# Patient Record
Sex: MALE | Race: WHITE | NOT HISPANIC OR LATINO | Employment: OTHER | URBAN - METROPOLITAN AREA
[De-identification: names, ages, dates, MRNs, and addresses within clinical notes are randomized per-mention and may not be internally consistent; named-entity substitution may affect disease eponyms.]

---

## 2021-01-14 DIAGNOSIS — Z23 NEED FOR VACCINATION: ICD-10-CM

## 2021-01-26 PROCEDURE — 0001A PFIZER SARS-COV-2 VACCINE: CPT

## 2021-01-26 PROCEDURE — 91300 PFIZER SARS-COV-2 VACCINE: CPT

## 2021-01-27 ENCOUNTER — IMMUNIZATION (OUTPATIENT)
Dept: FAMILY PLANNING/WOMEN'S HEALTH CLINIC | Facility: IMMUNIZATION CENTER | Age: 81
End: 2021-01-27
Payer: MEDICARE

## 2021-01-27 DIAGNOSIS — Z23 ENCOUNTER FOR VACCINATION: Primary | ICD-10-CM

## 2021-02-18 ENCOUNTER — NURSE TRIAGE (OUTPATIENT)
Dept: HEALTH INFORMATION MANAGEMENT | Facility: OTHER | Age: 81
End: 2021-02-18

## 2021-02-18 NOTE — TELEPHONE ENCOUNTER
"20 - 30 minutes after getting the Pfizer COVID vaccination, body/face swelled up, not throat, but difficult time breathing.      Stated that is RIGHT leg is still swollen.    Advised pt not to get the second dose, and to contact his PCP for further instructions, and gave pt the number to [facility] to report his symptoms.        Additional Information  • Negative: Difficulty with breathing or swallowing starts within 2 hours after injection  • Negative: Difficult to awaken or acting confused (e.g., disoriented, slurred speech)  • Negative: Unresponsive, passed out, or very weak  • Negative: Sounds like a life-threatening emergency to the triager  • Negative: Sounds like a severe, unusual reaction to the triager  • Negative: Fever > 103 F (39.4 C)  • Negative: Fever > 101 F (38.3 C) and over 60 years of age  • Negative: Fever > 100.0 F (37.8 C) and has diabetes mellitus or a weak immune system (e.g., HIV positive, cancer chemotherapy, organ transplant, splenectomy, chronic steroids)  • Negative: Fever > 100.0 F (37.8 C) and bedridden (e.g., nursing home patient, stroke, chronic illness, recovering from surgery)  • Negative: Measles vaccine and purple/blood-colored rash (onset day 6-12)  • Negative: Redness or red streak around the injection site begins > 48 hours after shot  • Negative: Fever present > 3 days (72 hours)  • Negative: Smallpox vaccine and eye pain, eye redness, or rash on eyelids  • Negative: Deep lump follows (in 2 to 8 weeks) Td or TDaP shot, and becomes tender to the touch  • Negative: Patient wants to be seen  • Negative: Mild immunization reaction  • Negative: Painless lump at Tetanus-diphtheria (Td) injection site  • Negative: Immunization reactions, questions about    Answer Assessment - Initial Assessment Questions  1. SYMPTOMS: \"What is the main symptom?\" (e.g., redness, swelling, pain)       Swelling all over his body and difficulty breathing  2. ONSET: \"When was the vaccine (shot) given?\" \"How " "much later did the 20-30 minutes begin?\" (e.g., hours, days ago)         3. SEVERITY: \"How bad is it?\"       Bad  4. FEVER: \"Is there a fever?\" If so, ask: \"What is it, how was it measured, and when did it start?\"       Yes  5. IMMUNIZATIONS GIVEN: \"What shots have you recently received?\"      COVID  6. PAST REACTIONS: \"Have you reacted to immunizations before?\" If so, ask: \"What happened?\"      No  7. OTHER SYMPTOMS: \"Do you have any other symptoms?\"      No    Protocols used: IMMUNIZATION [id number]      "

## 2021-08-21 ENCOUNTER — PATIENT MESSAGE (OUTPATIENT)
Dept: HEALTH INFORMATION MANAGEMENT | Facility: OTHER | Age: 81
End: 2021-08-21

## 2021-12-01 ENCOUNTER — TELEPHONE (OUTPATIENT)
Dept: HEALTH INFORMATION MANAGEMENT | Facility: OTHER | Age: 81
End: 2021-12-01

## 2022-06-09 ENCOUNTER — OFFICE VISIT (OUTPATIENT)
Dept: MEDICAL GROUP | Facility: MEDICAL CENTER | Age: 82
End: 2022-06-09
Payer: MEDICARE

## 2022-06-09 VITALS
DIASTOLIC BLOOD PRESSURE: 64 MMHG | SYSTOLIC BLOOD PRESSURE: 132 MMHG | BODY MASS INDEX: 24.68 KG/M2 | HEART RATE: 71 BPM | OXYGEN SATURATION: 97 % | WEIGHT: 166.6 LBS | HEIGHT: 69 IN | TEMPERATURE: 98.2 F

## 2022-06-09 DIAGNOSIS — Z13.220 SCREENING FOR LIPID DISORDERS: ICD-10-CM

## 2022-06-09 DIAGNOSIS — Z13.0 ENCOUNTER FOR SCREENING FOR HEMATOLOGIC DISORDER: ICD-10-CM

## 2022-06-09 DIAGNOSIS — H26.9 CATARACT OF BOTH EYES, UNSPECIFIED CATARACT TYPE: ICD-10-CM

## 2022-06-09 DIAGNOSIS — Z13.228 SCREENING FOR METABOLIC DISORDER: ICD-10-CM

## 2022-06-09 DIAGNOSIS — Z00.00 ENCOUNTER FOR MEDICAL EXAMINATION TO ESTABLISH CARE: ICD-10-CM

## 2022-06-09 DIAGNOSIS — Z13.21 ENCOUNTER FOR VITAMIN DEFICIENCY SCREENING: ICD-10-CM

## 2022-06-09 PROCEDURE — 99204 OFFICE O/P NEW MOD 45 MIN: CPT | Performed by: PHYSICIAN ASSISTANT

## 2022-06-09 ASSESSMENT — PATIENT HEALTH QUESTIONNAIRE - PHQ9: CLINICAL INTERPRETATION OF PHQ2 SCORE: 0

## 2022-06-09 NOTE — PROGRESS NOTES
"Subjective:   Rene López is a 82 y.o. male here today for     Encounter for medical examination to establish care  Patient is a pleasant 82-year-old male here to establish care.  Denies known history of any medical problems.  Had a family physician that was private practice but he retired.  Is here to establish care.  Takes no medication except for atenolol eyedrops for history of cataracts.  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  He now goes to the gym daily at [employer] and walks frequently.  He is not  and has had no kids for fun he likes to read.  Denies any concerns today         Current medicines (including changes today)  No current outpatient medications on file.     No current facility-administered medications for this visit.     He  has no past medical history on file.  Patient has no allergy information on record.     Social History and Family History were reviewed and updated.    ROS   No headaches, chest pain, no shortness of breath, abdominal pain, nausea, or vomiting.  All other systems were reviewed and are negative or noted as positive in the HPI.       Objective:     /64 (BP Location: Left arm, Patient Position: Sitting, BP Cuff Size: Adult)   Pulse 71   Temp 36.8 °C (98.2 °F) (Temporal)   Ht 1.753 m (5' 9\")   Wt 75.6 kg (166 lb 9.6 oz)   SpO2 97%  Body mass index is 24.6 kg/m².     Physical Exam:  Constitutional: ANO x3, no acute distress, well-nourished, well-hydrated   Skin: Warm, dry, good turgor, no rashes in visible areas.  HEENT: Is normocephalic atraumatic, good PERRLA, extraocular movements intact, TMs and oropharynx are clear with good dentition   Neck: Soft and supple, trachea midline, no masses.  No thyroid megaly or cervical lymphadenopathy noted  Cardiovascular: Regular rate and rhythm.  Normal S1 and 2, no murmurs, rubs or gallops.  No edema noted  Lungs: Clear to auscultation bilaterally.  No wheezes, rales or rhonchi.  Good " inspiratory and expiratory breath sounds  Abdomen: Soft, non-tender, no masses.  No hepatosplenomegaly.  Negative Colon's  Psych: Alert and oriented x3, normal affect and mood.  Neuro: Cranial nerves II through XII were assessed and intact.  Normal gait, normal cerebellar function noted  Musculoskeletal: Full range of motion, good strength against resistance    Clinical Course/Lab Analysis:        Assessment and Plan:   The following treatment plan was discussed.  Signs and symptoms for which to return were discussed with patient at length.  Patient verbalized understanding.    1. Cataract of both eyes, unspecified cataract type    2. Encounter for screening for hematologic disorder  - CBC WITH DIFFERENTIAL; Future    3. Screening for metabolic disorder  - Comp Metabolic Panel; Future    4. Encounter for vitamin deficiency screening  - VITAMIN D,25 HYDROXY; Future    5. Screening for lipid disorders  - LIPID PANEL    6. Encounter for medical examination to establish care     Continue to exercise daily.  I will order screening labs and have him follow-up in 3 months    Followup: 3 months  Please note that this dictation was created using voice recognition software. I have made every reasonable attempt to correct obvious errors, but I expect that there are errors of grammar and possibly content that I did not discover before finalizing the note.

## 2022-06-09 NOTE — ASSESSMENT & PLAN NOTE
Patient is a pleasant 82-year-old male here to establish care.  Denies known history of any medical problems.  Had a family physician that was private practice but he retired.  Is here to establish care.  Takes no medication except for atenolol eyedrops for history of cataracts.  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  He now goes to the gym daily at [employer] and walks frequently.  He is not  and has had no kids for fun he likes to read.  Denies any concerns today

## 2022-07-20 ENCOUNTER — PATIENT MESSAGE (OUTPATIENT)
Dept: HEALTH INFORMATION MANAGEMENT | Facility: OTHER | Age: 82
End: 2022-07-20

## 2022-08-29 ENCOUNTER — TELEPHONE (OUTPATIENT)
Dept: HEALTH INFORMATION MANAGEMENT | Facility: OTHER | Age: 82
End: 2022-08-29

## 2022-08-30 ENCOUNTER — HOSPITAL ENCOUNTER (OUTPATIENT)
Dept: LAB | Facility: MEDICAL CENTER | Age: 82
End: 2022-08-30
Attending: PHYSICIAN ASSISTANT
Payer: MEDICARE

## 2022-08-30 DIAGNOSIS — Z13.0 ENCOUNTER FOR SCREENING FOR HEMATOLOGIC DISORDER: ICD-10-CM

## 2022-08-30 DIAGNOSIS — Z13.21 ENCOUNTER FOR VITAMIN DEFICIENCY SCREENING: ICD-10-CM

## 2022-08-30 DIAGNOSIS — Z13.228 SCREENING FOR METABOLIC DISORDER: ICD-10-CM

## 2022-08-30 LAB
25(OH)D3 SERPL-MCNC: 37 NG/ML (ref 30–100)
ALBUMIN SERPL BCP-MCNC: 4.1 G/DL (ref 3.2–4.9)
ALBUMIN/GLOB SERPL: 1.6 G/DL
ALP SERPL-CCNC: 42 U/L (ref 30–99)
ALT SERPL-CCNC: 8 U/L (ref 2–50)
ANION GAP SERPL CALC-SCNC: 5 MMOL/L (ref 7–16)
AST SERPL-CCNC: 15 U/L (ref 12–45)
BASOPHILS # BLD AUTO: 0.6 % (ref 0–1.8)
BASOPHILS # BLD: 0.03 K/UL (ref 0–0.12)
BILIRUB SERPL-MCNC: 0.4 MG/DL (ref 0.1–1.5)
BUN SERPL-MCNC: 17 MG/DL (ref 8–22)
CALCIUM SERPL-MCNC: 8.8 MG/DL (ref 8.4–10.2)
CHLORIDE SERPL-SCNC: 109 MMOL/L (ref 96–112)
CHOLEST SERPL-MCNC: 195 MG/DL (ref 100–199)
CO2 SERPL-SCNC: 27 MMOL/L (ref 20–33)
CREAT SERPL-MCNC: 1.11 MG/DL (ref 0.5–1.4)
EOSINOPHIL # BLD AUTO: 0.07 K/UL (ref 0–0.51)
EOSINOPHIL NFR BLD: 1.3 % (ref 0–6.9)
ERYTHROCYTE [DISTWIDTH] IN BLOOD BY AUTOMATED COUNT: 46.5 FL (ref 35.9–50)
FASTING STATUS PATIENT QL REPORTED: NORMAL
GFR SERPLBLD CREATININE-BSD FMLA CKD-EPI: 65 ML/MIN/1.73 M 2
GLOBULIN SER CALC-MCNC: 2.6 G/DL (ref 1.9–3.5)
GLUCOSE SERPL-MCNC: 100 MG/DL (ref 65–99)
HCT VFR BLD AUTO: 41.3 % (ref 42–52)
HDLC SERPL-MCNC: 41 MG/DL
HGB BLD-MCNC: 13.9 G/DL (ref 14–18)
IMM GRANULOCYTES # BLD AUTO: 0.02 K/UL (ref 0–0.11)
IMM GRANULOCYTES NFR BLD AUTO: 0.4 % (ref 0–0.9)
LDLC SERPL CALC-MCNC: 139 MG/DL
LYMPHOCYTES # BLD AUTO: 1.64 K/UL (ref 1–4.8)
LYMPHOCYTES NFR BLD: 31.1 % (ref 22–41)
MCH RBC QN AUTO: 31.7 PG (ref 27–33)
MCHC RBC AUTO-ENTMCNC: 33.7 G/DL (ref 33.7–35.3)
MCV RBC AUTO: 94.1 FL (ref 81.4–97.8)
MONOCYTES # BLD AUTO: 0.43 K/UL (ref 0–0.85)
MONOCYTES NFR BLD AUTO: 8.2 % (ref 0–13.4)
NEUTROPHILS # BLD AUTO: 3.08 K/UL (ref 1.82–7.42)
NEUTROPHILS NFR BLD: 58.4 % (ref 44–72)
NRBC # BLD AUTO: 0 K/UL
NRBC BLD-RTO: 0 /100 WBC
PLATELET # BLD AUTO: 208 K/UL (ref 164–446)
PMV BLD AUTO: 8.5 FL (ref 9–12.9)
POTASSIUM SERPL-SCNC: 4.6 MMOL/L (ref 3.6–5.5)
PROT SERPL-MCNC: 6.7 G/DL (ref 6–8.2)
RBC # BLD AUTO: 4.39 M/UL (ref 4.7–6.1)
SODIUM SERPL-SCNC: 141 MMOL/L (ref 135–145)
TRIGL SERPL-MCNC: 76 MG/DL (ref 0–149)
WBC # BLD AUTO: 5.3 K/UL (ref 4.8–10.8)

## 2022-08-30 PROCEDURE — 36415 COLL VENOUS BLD VENIPUNCTURE: CPT

## 2022-08-30 PROCEDURE — 80061 LIPID PANEL: CPT

## 2022-08-30 PROCEDURE — 82306 VITAMIN D 25 HYDROXY: CPT

## 2022-08-30 PROCEDURE — 85025 COMPLETE CBC W/AUTO DIFF WBC: CPT

## 2022-08-30 PROCEDURE — 80053 COMPREHEN METABOLIC PANEL: CPT

## 2022-09-05 ENCOUNTER — SUPERVISING PHYSICIAN REVIEW (OUTPATIENT)
Dept: MEDICAL GROUP | Facility: MEDICAL CENTER | Age: 82
End: 2022-09-05
Payer: MEDICARE

## 2022-09-05 NOTE — PROGRESS NOTES
Supervising Physician Review    Reviewed note dated  6/9/2022.    Recommendations: Discuss and document about vaccines at next visit.

## 2022-09-06 ENCOUNTER — OFFICE VISIT (OUTPATIENT)
Dept: MEDICAL GROUP | Facility: MEDICAL CENTER | Age: 82
End: 2022-09-06
Payer: MEDICARE

## 2022-09-06 VITALS
SYSTOLIC BLOOD PRESSURE: 122 MMHG | HEART RATE: 78 BPM | OXYGEN SATURATION: 95 % | DIASTOLIC BLOOD PRESSURE: 78 MMHG | BODY MASS INDEX: 23.4 KG/M2 | TEMPERATURE: 97 F | WEIGHT: 158 LBS | HEIGHT: 69 IN

## 2022-09-06 DIAGNOSIS — H26.9 CATARACT OF BOTH EYES, UNSPECIFIED CATARACT TYPE: ICD-10-CM

## 2022-09-06 DIAGNOSIS — E78.00 PURE HYPERCHOLESTEROLEMIA: ICD-10-CM

## 2022-09-06 DIAGNOSIS — R79.89 ABNORMAL CBC: ICD-10-CM

## 2022-09-06 DIAGNOSIS — R73.9 ELEVATED BLOOD SUGAR: ICD-10-CM

## 2022-09-06 LAB
HBA1C MFR BLD: 5.2 % (ref 0–5.6)
INT CON NEG: NORMAL
INT CON POS: NORMAL

## 2022-09-06 PROCEDURE — 99214 OFFICE O/P EST MOD 30 MIN: CPT | Performed by: PHYSICIAN ASSISTANT

## 2022-09-06 PROCEDURE — 83036 HEMOGLOBIN GLYCOSYLATED A1C: CPT | Performed by: PHYSICIAN ASSISTANT

## 2022-09-06 RX ORDER — TIMOLOL MALEATE 5 MG/ML
SOLUTION/ DROPS OPHTHALMIC
COMMUNITY
Start: 2022-06-20

## 2022-09-06 ASSESSMENT — FIBROSIS 4 INDEX: FIB4 SCORE: 2.14

## 2022-09-06 NOTE — ASSESSMENT & PLAN NOTE
Noted to have slightly decreased hemoglobin A1c.  Was just under normal limit at 13.9.  Will give order to recheck.  Denies known blood in the stool.  Is up-to-date on colonoscopy and had a normal colonoscopy within the last 5 years

## 2022-09-06 NOTE — PROGRESS NOTES
"Subjective:   Rene López is a 82 y.o. male here today for lab results    HPI:    Patient comes in today for lab results.  Feels well.  Has history of cataracts and use timolol drops.  Has not been on any other medication.  Goes to the gym, [employer], regularly and does circuit training.  Feels well      Current medicines (including changes today)  No current outpatient medications on file.     No current facility-administered medications for this visit.     He  has no past medical history on file.  Patient has no allergy information on record.     Social History and Family History were reviewed and updated.    ROS   No headaches, chest pain, no shortness of breath, abdominal pain, nausea, or vomiting.  All other systems were reviewed and are negative or noted as positive in the HPI.       Objective:     /78   Pulse 78   Temp 36.1 °C (97 °F) (Temporal)   Ht 1.753 m (5' 9\")   Wt 71.7 kg (158 lb)   SpO2 95%  Body mass index is 23.33 kg/m².     Physical Exam:  General: Patient appears well-nourished, well-hydrated, nontoxic  HEENT, normocephalic atraumatic, PERRLA, extraocular movements intact, nares are patent and clear  Neck: No visible masses, thyromegaly or abnormalities noted  Cardiovascular.  Sitting comfortably without visible signs of edema  Lungs: No cyanosis noted, nondyspneic  Skin: Well perfused without evidence of rash or lesions  Neurological: Cranial nerves II through XII intact, normal gait  Musculoskeletal: Normal range of motion, normal strength and no deficit noted     Clinical Course/Lab Analysis:     Latest Reference Range & Units 8/30/22 09:38   WBC 4.8 - 10.8 K/uL 5.3   RBC 4.70 - 6.10 M/uL 4.39 (L)   Hemoglobin 14.0 - 18.0 g/dL 13.9 (L)   Hematocrit 42.0 - 52.0 % 41.3 (L)   MCV 81.4 - 97.8 fL 94.1   MCH 27.0 - 33.0 pg 31.7   MCHC 33.7 - 35.3 g/dL 33.7   RDW 35.9 - 50.0 fL 46.5   Platelet Count 164 - 446 K/uL 208   MPV 9.0 - 12.9 fL 8.5 (L)   Neutrophils-Polys 44.00 - 72.00 " % 58.40   Neutrophils (Absolute) 1.82 - 7.42 K/uL 3.08   Lymphocytes 22.00 - 41.00 % 31.10   Lymphs (Absolute) 1.00 - 4.80 K/uL 1.64   Monocytes 0.00 - 13.40 % 8.20   Monos (Absolute) 0.00 - 0.85 K/uL 0.43   Eosinophils 0.00 - 6.90 % 1.30   Eos (Absolute) 0.00 - 0.51 K/uL 0.07   Basophils 0.00 - 1.80 % 0.60   Baso (Absolute) 0.00 - 0.12 K/uL 0.03   Immature Granulocytes 0.00 - 0.90 % 0.40   Immature Granulocytes (abs) 0.00 - 0.11 K/uL 0.02   Nucleated RBC /100 WBC 0.00   NRBC (Absolute) K/uL 0.00   Sodium 135 - 145 mmol/L 141   Potassium 3.6 - 5.5 mmol/L 4.6   Chloride 96 - 112 mmol/L 109   Co2 20 - 33 mmol/L 27   Anion Gap 7.0 - 16.0  5.0 (L)   Glucose 65 - 99 mg/dL 100 (H)   Bun 8 - 22 mg/dL 17   Creatinine 0.50 - 1.40 mg/dL 1.11   GFR (CKD-EPI) >60 mL/min/1.73 m 2 65   Calcium 8.4 - 10.2 mg/dL 8.8   AST(SGOT) 12 - 45 U/L 15   ALT(SGPT) 2 - 50 U/L 8   Alkaline Phosphatase 30 - 99 U/L 42   Total Bilirubin 0.1 - 1.5 mg/dL 0.4   Albumin 3.2 - 4.9 g/dL 4.1   Total Protein 6.0 - 8.2 g/dL 6.7   Globulin 1.9 - 3.5 g/dL 2.6   A-G Ratio g/dL 1.6   Fasting Status  Fasting   Cholesterol,Tot 100 - 199 mg/dL 195   Triglycerides 0 - 149 mg/dL 76   HDL >=40 mg/dL 41   LDL <100 mg/dL 139 (H)   25-Hydroxy   Vitamin D 25 30 - 100 ng/mL 37   (L): Data is abnormally low  (H): Data is abnormally high    Assessment and Plan:   The following treatment plan was discussed.  Signs and symptoms for which to return were discussed with patient at length.  Patient verbalized understanding.    Problem List Items Addressed This Visit       Cataract of both eyes     Chronic and stable  Continue timolol eyedrops as needed         Elevated blood sugar     New and unstable: fasting blood sugar was 100.  Hemoglobin A1c done in clinic today was         Relevant Orders    POCT Hemoglobin A1C     Other Visit Diagnoses       Abnormal CBC        Relevant Orders    CBC WITH DIFFERENTIAL               Followup:    Please note that this dictation was created  using voice recognition software. I have made every reasonable attempt to correct obvious errors, but I expect that there are errors of grammar and possibly content that I did not discover before finalizing the note.

## 2023-07-19 ENCOUNTER — TELEPHONE (OUTPATIENT)
Dept: HEALTH INFORMATION MANAGEMENT | Facility: OTHER | Age: 83
End: 2023-07-19
Payer: MEDICARE

## 2023-08-31 ENCOUNTER — HOSPITAL ENCOUNTER (OUTPATIENT)
Dept: LAB | Facility: MEDICAL CENTER | Age: 83
End: 2023-08-31
Attending: PHYSICIAN ASSISTANT
Payer: MEDICARE

## 2023-08-31 DIAGNOSIS — R79.89 ABNORMAL CBC: ICD-10-CM

## 2023-08-31 LAB
BASOPHILS # BLD AUTO: 0.4 % (ref 0–1.8)
BASOPHILS # BLD: 0.02 K/UL (ref 0–0.12)
EOSINOPHIL # BLD AUTO: 0.06 K/UL (ref 0–0.51)
EOSINOPHIL NFR BLD: 1.1 % (ref 0–6.9)
ERYTHROCYTE [DISTWIDTH] IN BLOOD BY AUTOMATED COUNT: 46.2 FL (ref 35.9–50)
HCT VFR BLD AUTO: 41.1 % (ref 42–52)
HGB BLD-MCNC: 13.6 G/DL (ref 14–18)
IMM GRANULOCYTES # BLD AUTO: 0.02 K/UL (ref 0–0.11)
IMM GRANULOCYTES NFR BLD AUTO: 0.4 % (ref 0–0.9)
LYMPHOCYTES # BLD AUTO: 1.55 K/UL (ref 1–4.8)
LYMPHOCYTES NFR BLD: 28.5 % (ref 22–41)
MCH RBC QN AUTO: 31.3 PG (ref 27–33)
MCHC RBC AUTO-ENTMCNC: 33.1 G/DL (ref 32.3–36.5)
MCV RBC AUTO: 94.7 FL (ref 81.4–97.8)
MONOCYTES # BLD AUTO: 0.41 K/UL (ref 0–0.85)
MONOCYTES NFR BLD AUTO: 7.6 % (ref 0–13.4)
NEUTROPHILS # BLD AUTO: 3.37 K/UL (ref 1.82–7.42)
NEUTROPHILS NFR BLD: 62 % (ref 44–72)
NRBC # BLD AUTO: 0 K/UL
NRBC BLD-RTO: 0 /100 WBC (ref 0–0.2)
PLATELET # BLD AUTO: 230 K/UL (ref 164–446)
PMV BLD AUTO: 8.8 FL (ref 9–12.9)
RBC # BLD AUTO: 4.34 M/UL (ref 4.7–6.1)
WBC # BLD AUTO: 5.4 K/UL (ref 4.8–10.8)

## 2023-08-31 PROCEDURE — 36415 COLL VENOUS BLD VENIPUNCTURE: CPT

## 2023-08-31 PROCEDURE — 85025 COMPLETE CBC W/AUTO DIFF WBC: CPT

## 2023-09-05 ENCOUNTER — HOSPITAL ENCOUNTER (OUTPATIENT)
Dept: LAB | Facility: MEDICAL CENTER | Age: 83
End: 2023-09-05
Attending: PHYSICIAN ASSISTANT
Payer: MEDICARE

## 2023-09-05 ENCOUNTER — OFFICE VISIT (OUTPATIENT)
Dept: MEDICAL GROUP | Facility: MEDICAL CENTER | Age: 83
End: 2023-09-05
Payer: MEDICARE

## 2023-09-05 VITALS
SYSTOLIC BLOOD PRESSURE: 118 MMHG | DIASTOLIC BLOOD PRESSURE: 78 MMHG | TEMPERATURE: 97.6 F | BODY MASS INDEX: 20.76 KG/M2 | RESPIRATION RATE: 20 BRPM | HEART RATE: 60 BPM | OXYGEN SATURATION: 98 % | WEIGHT: 145 LBS | HEIGHT: 70 IN

## 2023-09-05 DIAGNOSIS — E78.00 PURE HYPERCHOLESTEROLEMIA: ICD-10-CM

## 2023-09-05 DIAGNOSIS — R73.9 ELEVATED BLOOD SUGAR: ICD-10-CM

## 2023-09-05 DIAGNOSIS — Z00.00 MEDICARE ANNUAL WELLNESS VISIT, SUBSEQUENT: ICD-10-CM

## 2023-09-05 DIAGNOSIS — H26.9 CATARACT OF BOTH EYES, UNSPECIFIED CATARACT TYPE: ICD-10-CM

## 2023-09-05 LAB
ALBUMIN SERPL BCP-MCNC: 4 G/DL (ref 3.2–4.9)
ALBUMIN/GLOB SERPL: 1.3 G/DL
ALP SERPL-CCNC: 50 U/L (ref 30–99)
ALT SERPL-CCNC: 12 U/L (ref 2–50)
ANION GAP SERPL CALC-SCNC: 7 MMOL/L (ref 7–16)
AST SERPL-CCNC: 21 U/L (ref 12–45)
BILIRUB SERPL-MCNC: 0.4 MG/DL (ref 0.1–1.5)
BUN SERPL-MCNC: 20 MG/DL (ref 8–22)
CALCIUM ALBUM COR SERPL-MCNC: 9.1 MG/DL (ref 8.5–10.5)
CALCIUM SERPL-MCNC: 9.1 MG/DL (ref 8.4–10.2)
CHLORIDE SERPL-SCNC: 102 MMOL/L (ref 96–112)
CHOLEST SERPL-MCNC: 187 MG/DL (ref 100–199)
CO2 SERPL-SCNC: 28 MMOL/L (ref 20–33)
CREAT SERPL-MCNC: 1.02 MG/DL (ref 0.5–1.4)
EST. AVERAGE GLUCOSE BLD GHB EST-MCNC: 117 MG/DL
FASTING STATUS PATIENT QL REPORTED: NORMAL
GFR SERPLBLD CREATININE-BSD FMLA CKD-EPI: 72 ML/MIN/1.73 M 2
GLOBULIN SER CALC-MCNC: 3 G/DL (ref 1.9–3.5)
GLUCOSE SERPL-MCNC: 113 MG/DL (ref 65–99)
HBA1C MFR BLD: 5.7 % (ref 4–5.6)
HDLC SERPL-MCNC: 49 MG/DL
LDLC SERPL CALC-MCNC: 129 MG/DL
POTASSIUM SERPL-SCNC: 5.1 MMOL/L (ref 3.6–5.5)
PROT SERPL-MCNC: 7 G/DL (ref 6–8.2)
SODIUM SERPL-SCNC: 137 MMOL/L (ref 135–145)
TRIGL SERPL-MCNC: 47 MG/DL (ref 0–149)

## 2023-09-05 PROCEDURE — 3078F DIAST BP <80 MM HG: CPT | Performed by: PHYSICIAN ASSISTANT

## 2023-09-05 PROCEDURE — 36415 COLL VENOUS BLD VENIPUNCTURE: CPT

## 2023-09-05 PROCEDURE — 80061 LIPID PANEL: CPT

## 2023-09-05 PROCEDURE — G0439 PPPS, SUBSEQ VISIT: HCPCS | Performed by: PHYSICIAN ASSISTANT

## 2023-09-05 PROCEDURE — 3074F SYST BP LT 130 MM HG: CPT | Performed by: PHYSICIAN ASSISTANT

## 2023-09-05 PROCEDURE — 80053 COMPREHEN METABOLIC PANEL: CPT

## 2023-09-05 PROCEDURE — 83036 HEMOGLOBIN GLYCOSYLATED A1C: CPT

## 2023-09-05 ASSESSMENT — PATIENT HEALTH QUESTIONNAIRE - PHQ9: CLINICAL INTERPRETATION OF PHQ2 SCORE: 0

## 2023-09-05 ASSESSMENT — ACTIVITIES OF DAILY LIVING (ADL): BATHING_REQUIRES_ASSISTANCE: 0

## 2023-09-05 ASSESSMENT — FIBROSIS 4 INDEX: FIB4 SCORE: 1.96

## 2023-09-05 NOTE — PROGRESS NOTES
Chief Complaint   Patient presents with    Annual Exam       HPI:  Rene López is a 83 y.o. here for Medicare Annual Wellness Visit     Patient Active Problem List    Diagnosis Date Noted    Elevated blood sugar 09/06/2022    Pure hypercholesterolemia 09/06/2022    Abnormal CBC 09/06/2022    Cataract of both eyes 06/09/2022    Encounter for medical examination to establish care 06/09/2022       Current Outpatient Medications   Medication Sig Dispense Refill    timolol (TIMOPTIC) 0.5 % Solution INSTILL 1 DROP INTO EACH EYE IN THE MORNING       No current facility-administered medications for this visit.          Current supplements as per medication list.     Allergies: Patient has no allergy information on record.    Current social contact/activities: gym 4 times for week     He  reports that he has never smoked. He has never used smokeless tobacco. He reports that he does not drink alcohol and does not use drugs.  Counseling given: Not Answered      ROS:    Gait: Uses no assistive device  Ostomy: No  Other tubes: No  Amputations: No  Chronic oxygen use: No  Last eye exam:3 months ago  Wears hearing aids: No   : Denies any urinary leakage during the last 6 months    Screening:    Depression Screening  Little interest or pleasure in doing things?  0 - not at all  Feeling down, depressed , or hopeless? 0 - not at all  Patient Health Questionnaire Score: 0     If depressive symptoms identified deferred to follow up visit unless specifically addressed in assessment and plan.    Interpretation of PHQ-9 Total Score   Score Severity   1-4 No Depression   5-9 Mild Depression   10-14 Moderate Depression   15-19 Moderately Severe Depression   20-27 Severe Depression    Screening for Cognitive Impairment  Three Minute Recall (daughter, heaven, mountain) 3/3    Todd clock face with all 12 numbers and set the hands to show 10 past 11.  Yes    Cognitive concerns identified deferred for follow up unless specifically  addressed in assessment and plan.    Fall Risk Assessment  Has the patient had two or more falls in the last year or any fall with injury in the last year?  No    Safety Assessment  Throw rugs on floor.     Handrails on all stairs.     Good lighting in all hallways.     Difficulty hearing.  No  Patient counseled about all safety risks that were identified.    Functional Assessment ADLs  Are there any barriers preventing you from cooking for yourself or meeting nutritional needs?  No.    Are there any barriers preventing you from driving safely or obtaining transportation?  No.    Are there any barriers preventing you from using a telephone or calling for help?  No.    Are there any barriers preventing you from shopping?  No.    Are there any barriers preventing you from taking care of your own finances?  No.    Are there any barriers preventing you from managing your medications?  No.    Are there any barriers preventing you from showering, bathing or dressing yourself?  No.    Are you currently engaging in any exercise or physical activity?  Yes.     What is your perception of your health?       Advance Care Planning  Do you have an Advance Directive, Living Will, Durable Power of , or POLST? Yes                 Health Maintenance Summary            Postponed - Pneumococcal Vaccine: 65+ Years (2 - PPSV23 or PCV20) Postponed until 9/28/2023 05/24/2016  Outside Immunization: PCV-13 (Prevnar 13)              Postponed - Zoster (Shingles) Vaccines (1 of 2) Postponed until 2/5/2024      No completion history exists for this topic.              Postponed - COVID-19 Vaccine (5 - Pfizer series) Postponed until 9/5/2024      10/06/2022  Imm Admin: PFIZER BIVALENT SARS-COV-2 VACCINE (12+)    02/16/2022  Imm Admin: PFIZER VELASCO CAP SARS-COV-2 VACCINATION (12+)    08/21/2021  Imm Admin: PFIZER PURPLE CAP SARS-COV-2 VACCINATION (12+)    01/26/2021  Imm Admin: PFIZER PURPLE CAP SARS-COV-2 VACCINATION (12+)               Annual Wellness Visit (Every 366 Days) Next due on 9/5/2024 09/05/2023  Level of Service: MO ANNUAL WELLNESS VISIT-INCLUDES PPPS SUBSEQUE*    09/05/2023  Visit Dx: Medicare annual wellness visit, subsequent              IMM DTaP/Tdap/Td Vaccine (2 - Td or Tdap) Next due on 5/12/2025 05/12/2015  Outside Immunization: Tdap              Influenza Vaccine (Series Information) Completed      09/01/2023  Outside Immunization: Fluzone High-Dose Quad    10/06/2022  Outside Immunization: Fluzone High-Dose Quad    09/29/2021  Outside Immunization: Fluzone High-Dose Quad    09/12/2020  Outside Immunization: Influenza Quad Adjuvanted    10/01/2018  Outside Immunization: Influenza Quad W/Pre    Only the first 5 history entries have been loaded, but more history exists.              Hepatitis A Vaccine (Hep A) (Series Information) Aged Out      No completion history exists for this topic.              Hepatitis B Vaccine (Hep B) (Series Information) Aged Out      No completion history exists for this topic.              HPV Vaccines (Series Information) Aged Out      No completion history exists for this topic.              Polio Vaccine (Inactivated Polio) (Series Information) Aged Out      No completion history exists for this topic.              IMM MENINGOCOCCAL ACWY VACCINE (Series Information) Aged Out      No completion history exists for this topic.                    Patient Care Team:  Ev Watson P.A.-C. as PCP - General (Family Medicine)  Rayna Fajardo P.A.-C. as PCP - [facility] Paneled        Social History     Tobacco Use    Smoking status: Never    Smokeless tobacco: Never   Vaping Use    Vaping Use: Never used   Substance Use Topics    Alcohol use: Never    Drug use: Never     Family History   Family history unknown: Yes     He  has no past medical history on file.   Past Surgical History:   Procedure Laterality Date    INGUINAL HERNIA REPAIR  06/25/2008    Performed by AYUSH RIVAS at SURGERY  [facility]SAME DAY [facility] ORS    KNEE REPLACEMENT, TOTAL Left        Exam:   /78   Pulse 60   Temp 36.4 °C (97.6 °F) (Temporal)   Resp 20   Ht 1.778 m (5' 10\")   Wt 65.8 kg (145 lb)   SpO2 98%  Body mass index is 20.81 kg/m².    Hearing excellent.    Dentition good  Alert, oriented in no acute distress.  Eye contact is good, speech goal directed, affect calm  Gen.: Patient is A and O ×3, no acute distress, well-nourished well-hydrated  Vitals: Are listed and unremarkable  HEENT: Heads normocephalic, atraumatic, PERRLA, extraocular movements intact, TMs and oropharynx clear  Neck: Soft supple without cervical lymphadenopathy  Cardiovascular: Regular rate and rhythm normal S1 and S2. No murmurs, rubs or gallops  Lungs are clear to auscultation bilaterally. no wheezes rales or rhonchi  Abdomen is soft, nontender, nondistended with good bowel sounds, no hepatosplenomegaly  Skin: Is well perfused without evidence of rash or lesions  Neurological:  cranial nerves II through XII were assessed and intact.  Musculoskeletal: Full range of motion, 5 out of 5 strength against resistance  Neurovascularly: Intact with a 2 second cap refill, good distal pulses    Assessment and Plan. The following treatment and monitoring plan is recommended:    1. Cataract of both eyes, unspecified cataract type  Chronic condition: Continue timolol optic drops  2. Elevated blood sugar  - HEMOGLOBIN A1C; Future    3. Pure hypercholesterolemia  Chronic condition that is stable  - Lipid Profile; Future    Health maintenance is up-to-date  Services suggested: No services needed at this time  Health Care Screening: Age-appropriate preventive services recommended by USPTF and ACIP covered by Medicare were discussed today. Services ordered if indicated and agreed upon by the patient.  Referrals offered: Community-based lifestyle interventions to reduce health risks and promote self-management and wellness, fall prevention, nutrition, physical " activity, tobacco-use cessation, weight loss, and mental health services as per orders if indicated.    Discussion today about general wellness and lifestyle habits:    Prevent falls and reduce trip hazards; Cautioned about securing or removing rugs.  Have a working fire alarm and carbon monoxide detector;   Engage in regular physical activity and social activities     Follow-up: 1 year or sooner prn

## 2023-09-27 ENCOUNTER — HOSPITAL ENCOUNTER (EMERGENCY)
Facility: MEDICAL CENTER | Age: 83
End: 2023-09-28
Attending: STUDENT IN AN ORGANIZED HEALTH CARE EDUCATION/TRAINING PROGRAM
Payer: MEDICARE

## 2023-09-27 ENCOUNTER — APPOINTMENT (OUTPATIENT)
Dept: RADIOLOGY | Facility: MEDICAL CENTER | Age: 83
End: 2023-09-27
Attending: STUDENT IN AN ORGANIZED HEALTH CARE EDUCATION/TRAINING PROGRAM
Payer: MEDICARE

## 2023-09-27 VITALS
OXYGEN SATURATION: 93 % | WEIGHT: 142.64 LBS | TEMPERATURE: 96.6 F | DIASTOLIC BLOOD PRESSURE: 98 MMHG | SYSTOLIC BLOOD PRESSURE: 135 MMHG | HEART RATE: 121 BPM | BODY MASS INDEX: 20.42 KG/M2 | HEIGHT: 70 IN | RESPIRATION RATE: 20 BRPM

## 2023-09-27 DIAGNOSIS — K62.89 RECTAL PAIN: ICD-10-CM

## 2023-09-27 DIAGNOSIS — K59.00 CONSTIPATION, UNSPECIFIED CONSTIPATION TYPE: Primary | ICD-10-CM

## 2023-09-27 DIAGNOSIS — D72.829 LEUKOCYTOSIS, UNSPECIFIED TYPE: ICD-10-CM

## 2023-09-27 DIAGNOSIS — K62.9 RECTAL ABNORMALITY: ICD-10-CM

## 2023-09-27 DIAGNOSIS — E86.0 DEHYDRATION: ICD-10-CM

## 2023-09-27 LAB
ALBUMIN SERPL BCP-MCNC: 3.8 G/DL (ref 3.2–4.9)
ALBUMIN/GLOB SERPL: 1.2 G/DL
ALP SERPL-CCNC: 55 U/L (ref 30–99)
ALT SERPL-CCNC: 14 U/L (ref 2–50)
ANION GAP SERPL CALC-SCNC: 13 MMOL/L (ref 7–16)
AST SERPL-CCNC: 25 U/L (ref 12–45)
BASOPHILS # BLD AUTO: 0.2 % (ref 0–1.8)
BASOPHILS # BLD: 0.03 K/UL (ref 0–0.12)
BILIRUB SERPL-MCNC: 0.3 MG/DL (ref 0.1–1.5)
BUN SERPL-MCNC: 56 MG/DL (ref 8–22)
CALCIUM ALBUM COR SERPL-MCNC: 9.2 MG/DL (ref 8.5–10.5)
CALCIUM SERPL-MCNC: 9 MG/DL (ref 8.4–10.2)
CHLORIDE SERPL-SCNC: 100 MMOL/L (ref 96–112)
CO2 SERPL-SCNC: 24 MMOL/L (ref 20–33)
CREAT SERPL-MCNC: 1.27 MG/DL (ref 0.5–1.4)
EOSINOPHIL # BLD AUTO: 0.02 K/UL (ref 0–0.51)
EOSINOPHIL NFR BLD: 0.2 % (ref 0–6.9)
ERYTHROCYTE [DISTWIDTH] IN BLOOD BY AUTOMATED COUNT: 44.8 FL (ref 35.9–50)
GFR SERPLBLD CREATININE-BSD FMLA CKD-EPI: 55 ML/MIN/1.73 M 2
GLOBULIN SER CALC-MCNC: 3.2 G/DL (ref 1.9–3.5)
GLUCOSE SERPL-MCNC: 179 MG/DL (ref 65–99)
HCT VFR BLD AUTO: 38.3 % (ref 42–52)
HGB BLD-MCNC: 13.1 G/DL (ref 14–18)
IMM GRANULOCYTES # BLD AUTO: 0.05 K/UL (ref 0–0.11)
IMM GRANULOCYTES NFR BLD AUTO: 0.4 % (ref 0–0.9)
LYMPHOCYTES # BLD AUTO: 1.75 K/UL (ref 1–4.8)
LYMPHOCYTES NFR BLD: 13.7 % (ref 22–41)
MCH RBC QN AUTO: 31.8 PG (ref 27–33)
MCHC RBC AUTO-ENTMCNC: 34.2 G/DL (ref 32.3–36.5)
MCV RBC AUTO: 93 FL (ref 81.4–97.8)
MONOCYTES # BLD AUTO: 0.76 K/UL (ref 0–0.85)
MONOCYTES NFR BLD AUTO: 5.9 % (ref 0–13.4)
NEUTROPHILS # BLD AUTO: 10.21 K/UL (ref 1.82–7.42)
NEUTROPHILS NFR BLD: 79.6 % (ref 44–72)
NRBC # BLD AUTO: 0 K/UL
NRBC BLD-RTO: 0 /100 WBC (ref 0–0.2)
PLATELET # BLD AUTO: 297 K/UL (ref 164–446)
PMV BLD AUTO: 8.7 FL (ref 9–12.9)
POTASSIUM SERPL-SCNC: 4.8 MMOL/L (ref 3.6–5.5)
PROT SERPL-MCNC: 7 G/DL (ref 6–8.2)
RBC # BLD AUTO: 4.12 M/UL (ref 4.7–6.1)
SODIUM SERPL-SCNC: 137 MMOL/L (ref 135–145)
WBC # BLD AUTO: 12.8 K/UL (ref 4.8–10.8)

## 2023-09-27 PROCEDURE — 99284 EMERGENCY DEPT VISIT MOD MDM: CPT

## 2023-09-27 PROCEDURE — 74177 CT ABD & PELVIS W/CONTRAST: CPT

## 2023-09-27 PROCEDURE — 85025 COMPLETE CBC W/AUTO DIFF WBC: CPT

## 2023-09-27 PROCEDURE — 700105 HCHG RX REV CODE 258: Performed by: STUDENT IN AN ORGANIZED HEALTH CARE EDUCATION/TRAINING PROGRAM

## 2023-09-27 PROCEDURE — 80053 COMPREHEN METABOLIC PANEL: CPT

## 2023-09-27 PROCEDURE — 700101 HCHG RX REV CODE 250: Performed by: STUDENT IN AN ORGANIZED HEALTH CARE EDUCATION/TRAINING PROGRAM

## 2023-09-27 PROCEDURE — 36415 COLL VENOUS BLD VENIPUNCTURE: CPT

## 2023-09-27 PROCEDURE — 700117 HCHG RX CONTRAST REV CODE 255: Performed by: STUDENT IN AN ORGANIZED HEALTH CARE EDUCATION/TRAINING PROGRAM

## 2023-09-27 RX ORDER — AMOXICILLIN AND CLAVULANATE POTASSIUM 875; 125 MG/1; MG/1
1 TABLET, FILM COATED ORAL ONCE
Status: COMPLETED | OUTPATIENT
Start: 2023-09-28 | End: 2023-09-28

## 2023-09-27 RX ORDER — POLYETHYLENE GLYCOL 3350 17 G/17G
17 POWDER, FOR SOLUTION ORAL
Qty: 30 EACH | Refills: 0 | Status: SHIPPED | OUTPATIENT
Start: 2023-09-27

## 2023-09-27 RX ORDER — SODIUM CHLORIDE, SODIUM LACTATE, POTASSIUM CHLORIDE, CALCIUM CHLORIDE 600; 310; 30; 20 MG/100ML; MG/100ML; MG/100ML; MG/100ML
1000 INJECTION, SOLUTION INTRAVENOUS ONCE
Status: COMPLETED | OUTPATIENT
Start: 2023-09-27 | End: 2023-09-27

## 2023-09-27 RX ORDER — ENEMA 19; 7 G/133ML; G/133ML
1 ENEMA RECTAL ONCE
Status: COMPLETED | OUTPATIENT
Start: 2023-09-27 | End: 2023-09-27

## 2023-09-27 RX ORDER — AMOXICILLIN AND CLAVULANATE POTASSIUM 875; 125 MG/1; MG/1
1 TABLET, FILM COATED ORAL 2 TIMES DAILY
Qty: 14 TABLET | Refills: 0 | Status: ACTIVE | OUTPATIENT
Start: 2023-09-27 | End: 2023-10-03

## 2023-09-27 RX ADMIN — SODIUM PHOSPHATE 133 ML: 7; 19 ENEMA RECTAL at 20:15

## 2023-09-27 RX ADMIN — SODIUM CHLORIDE, POTASSIUM CHLORIDE, SODIUM LACTATE AND CALCIUM CHLORIDE 1000 ML: 600; 310; 30; 20 INJECTION, SOLUTION INTRAVENOUS at 20:15

## 2023-09-27 RX ADMIN — IOHEXOL 100 ML: 350 INJECTION, SOLUTION INTRAVENOUS at 21:53

## 2023-09-27 ASSESSMENT — FIBROSIS 4 INDEX: FIB4 SCORE: 2.24

## 2023-09-28 PROCEDURE — 700102 HCHG RX REV CODE 250 W/ 637 OVERRIDE(OP): Performed by: STUDENT IN AN ORGANIZED HEALTH CARE EDUCATION/TRAINING PROGRAM

## 2023-09-28 PROCEDURE — A9270 NON-COVERED ITEM OR SERVICE: HCPCS | Performed by: STUDENT IN AN ORGANIZED HEALTH CARE EDUCATION/TRAINING PROGRAM

## 2023-09-28 RX ADMIN — AMOXICILLIN AND CLAVULANATE POTASSIUM 1 TABLET: 875; 125 TABLET, FILM COATED ORAL at 00:15

## 2023-09-28 NOTE — ED PROVIDER NOTES
CHIEF COMPLAINT  Chief Complaint   Patient presents with    Constipation     Patient has not had a bowel movement.     Rectal Pain     Patient states he has been having pain in his rectum worse than when he broke his leg.        LIMITATION TO HISTORY   Select:  none    HPI    Rene López is a 83 y.o. male who presents to the Emergency Department for evaluation of constipation patient reports he has not had a bowel movement in several days she is unsure exactly when he last had a normal, reports he has no pain in his abdomen at rest he reports he only has pain in his rectum when he is straining to have a bowel movement, reports history of hemorrhoids reports he thinks he had a viral illness last week and that he has not been drinking as much water he is with his nephew he says he always has to remind his uncle to drink water    OUTSIDE HISTORIAN(S):  Select: Nephew    EXTERNAL RECORDS REVIEWED  Select:      PAST MEDICAL HISTORY  History reviewed. No pertinent past medical history.  .    SURGICAL HISTORY  Past Surgical History:   Procedure Laterality Date    INGUINAL HERNIA REPAIR  06/25/2008    Performed by AYUSH RIVAS at SURGERY SAME DAY [facility] ORS    KNEE REPLACEMENT, TOTAL Left          FAMILY HISTORY  Family History   Family history unknown: Yes          SOCIAL HISTORY  Social History     Socioeconomic History    Marital status: Single     Spouse name: Not on file    Number of children: Not on file    Years of education: Not on file    Highest education level: Not on file   Occupational History    Not on file   Tobacco Use    Smoking status: Never    Smokeless tobacco: Never   Vaping Use    Vaping Use: Never used   Substance and Sexual Activity    Alcohol use: Never    Drug use: Never    Sexual activity: Not on file   Other Topics Concern    Not on file   Social History Narrative    Not on file     Social Determinants of Health     Financial Resource Strain: Not on file   Food Insecurity: Not on file  "  Transportation Needs: Not on file   Physical Activity: Not on file   Stress: Not on file   Social Connections: Not on file   Intimate Partner Violence: Not on file   Housing Stability: Not on file         CURRENT MEDICATIONS  No current facility-administered medications on file prior to encounter.     Current Outpatient Medications on File Prior to Encounter   Medication Sig Dispense Refill    timolol (TIMOPTIC) 0.5 % Solution INSTILL 1 DROP INTO EACH EYE IN THE MORNING             ALLERGIES  Not on File    PHYSICAL EXAM  VITAL SIGNS:BP (!) 135/98   Pulse (!) 121   Temp 35.9 °C (96.6 °F) (Temporal)   Resp 20   Ht 1.778 m (5' 10\")   Wt 64.7 kg (142 lb 10.2 oz)   SpO2 93%   BMI 20.47 kg/m²       GENERAL: Awake and alert  HEAD: Normocephalic and atraumatic  NECK: Normal range of motion, without meningismus  EYES: Pupils Equal, Round, Reactive to Light, extraocular movements intact, conjunctiva white  ENT: Mucous membranes moist, oropharynx clear  PULMONARY: Normal effort, clear to auscultation  CARDIOVASCULAR: No murmurs, clicks or rubs, peripheral pulses 2+  ABDOMINAL: Soft, non-tender, no guarding or rigidity present, no pulsatile masses  RECTAL: Large circumferential pinkish soft tissue present consistent with hemorrhoids external present 360 degrees Brown stool, appropriate tone, sensation, no masses or lesions  BACK: no midline tenderness, no costovertebral tenderness  NEUROLOGICAL: Grossly non-focal neurological examination, speech normal, gait normal  EXTREMITIES: No edema, normal to inspection  SKIN: Warm and dry.  PSYCHIATRIC: Affect is appropriate    DIAGNOSTIC STUDIES / PROCEDURES  EKG      LABS  Labs Reviewed   CBC WITH DIFFERENTIAL - Abnormal; Notable for the following components:       Result Value    WBC 12.8 (*)     RBC 4.12 (*)     Hemoglobin 13.1 (*)     Hematocrit 38.3 (*)     MPV 8.7 (*)     Neutrophils-Polys 79.60 (*)     Lymphocytes 13.70 (*)     Neutrophils (Absolute) 10.21 (*)     All " other components within normal limits   COMP METABOLIC PANEL - Abnormal; Notable for the following components:    Glucose 179 (*)     Bun 56 (*)     All other components within normal limits   ESTIMATED GFR - Abnormal; Notable for the following components:    GFR (CKD-EPI) 55 (*)     All other components within normal limits         RADIOLOGY  I independently reviewed and interpreted the images obtained today in the ER.    Radiologist interpretation:   CT-ABDOMEN-PELVIS WITH   Final Result         1.  Distal rectal wall thickening with slight adjacent hazy fat stranding, consider proctitis versus rectal mass. Correlate with exam.   2.  Patchy nodular infiltrates in the bilateral lung bases, appearance suggests atypical infiltrates, consider aspiration. Recommend radiographic follow-up to resolution.   3.  Mild fluid-filled prominence of small bowel suggests ileus and/or enteritis   4.  Calcified right pleural calcifications.   5.  Hepatomegaly   6.  Prostate enlargement, consider causes of prostate enlargement with additional workup as appropriate.   7.  Atherosclerosis and atherosclerotic coronary artery disease           COURSE & MEDICAL DECISION MAKING    ED COURSE:    ED Observation Status? Yes;I am placing the patient in to an observation status due to a diagnostic uncertainty as well as therapeutic intensity. Patient placed in observation status at 6:52 PM 9/27/2023    Observation plan is as follows: We will observe the patient perform serial abdominal exams ensure patient has adequate bowel movement will provide IV fluids as I do suspect he has some dehydration, will ensure patient is able to have a bowel movement prior to discharge home    9 PM discussed patient's work-up so far he is a leukocytosis given his age and leukocytosis we will obtain a CT scan    1130 PM patient successfully had a bowel movement on the commode, digital rectal examination is without any residual stool he does have evidence of  hemorrhoids do not see any strong findings consistent with proctitis    INTERVENTIONS BY ME:  Medications   LR infusion (bolus) (0 mL Intravenous Stopped 9/27/23 2242)   sodium phosphate (Fleet) enema 133 mL (133 mL Rectal Given 9/27/23 2015)   iohexol (OMNIPAQUE) 350 mg/mL (IV) (100 mL Intravenous Given 9/27/23 2153)   amoxicillin-clavulanate (Augmentin) 875-125 MG per tablet 1 Tablet (1 Tablet Oral Given 9/28/23 0015)       Response on recheck: Patient improved      Patient discharged from ED observation at 12:12 AM 09/28/23  .      HYDRATION: Based on the patient's presentation of Dehydration the patient was given IV fluids. IV Hydration was used because oral hydration was not adequate alone. Upon recheck following hydration, the patient was improved and adequately rehydrated  INITIAL ASSESSMENT, COURSE AND PLAN  Care Narrative:     Patient presenting with a chief complaint of constipation and some rectal pain,  in addition to history consistent with constipation and dehydration, IV fluids and amount blood work was obtained he was placed in observation as detailed above blood work is notable for leukocytosis and elevated BUN level, CT scan obtained I do not see strong signs of proctitis on his exam does have some hemorrhoids or possibly a mass that I discussed with him he is instructed follow-up with general surgery, he certainly does not have any signs of abscess, his constipation was successfully treated, we will treat him with Augmentin. Suspect BUN elevation is prerenal azotemia provided IVF's and encouraged increased PO intake.          ADDITIONAL PROBLEM LIST    DISPOSITION AND DISCUSSIONS      Discussion of management with other Q or appropriate source(s): None     Escalation of care considered, and ultimately not performed:acute inpatient care management, however at this time, the patient is most appropriate for outpatient management        Decision tools and prescription drugs considered including, but  not limited to: augmentin    FINAL DIAGNOSIS  1. Proctitis   2. Constipation    3. Leukocytosis, unspecified type    4. Dehydration    5. Rectal abnormality             Electronically signed by: Gerard Garibay DO ,12:12 AM 09/28/23

## 2023-09-28 NOTE — ED NOTES
PIV removed.     Patient is stable for discharge at this time, anticipatory guidance provided, close follow-up is encouraged, and ED return instructions have been detailed. Patient and family are both agreeable to the disposition and plan and discharged home in ambulatory state and in good condition.     Rx education provided, Pt verbalized understanding;

## 2023-09-28 NOTE — ED TRIAGE NOTES
"Patient presents to the ER with the following complaints:    Chief Complaint   Patient presents with    Constipation     Patient has not had a bowel movement.     Rectal Pain     Patient states he has been having pain in his rectum worse than when he broke his leg.        BP (!) 135/98   Pulse (!) 121   Temp 35.9 °C (96.6 °F) (Temporal)   Resp 20   Ht 1.778 m (5' 10\")   Wt 64.7 kg (142 lb 10.2 oz)   SpO2 93%   BMI 20.47 kg/m²       "

## 2023-10-03 ENCOUNTER — OFFICE VISIT (OUTPATIENT)
Dept: MEDICAL GROUP | Facility: MEDICAL CENTER | Age: 83
End: 2023-10-03
Payer: MEDICARE

## 2023-10-03 VITALS
TEMPERATURE: 97.7 F | HEIGHT: 70 IN | SYSTOLIC BLOOD PRESSURE: 108 MMHG | DIASTOLIC BLOOD PRESSURE: 74 MMHG | HEART RATE: 64 BPM | BODY MASS INDEX: 19.81 KG/M2 | OXYGEN SATURATION: 96 % | WEIGHT: 138.4 LBS | RESPIRATION RATE: 20 BRPM

## 2023-10-03 DIAGNOSIS — R79.89 ABNORMAL CBC: ICD-10-CM

## 2023-10-03 DIAGNOSIS — K62.89 RECTAL MASS: ICD-10-CM

## 2023-10-03 DIAGNOSIS — K52.9 ENTERITIS: ICD-10-CM

## 2023-10-03 DIAGNOSIS — K59.00 CONSTIPATION, UNSPECIFIED CONSTIPATION TYPE: ICD-10-CM

## 2023-10-03 DIAGNOSIS — N40.0 ENLARGED PROSTATE: ICD-10-CM

## 2023-10-03 PROCEDURE — 3078F DIAST BP <80 MM HG: CPT | Performed by: PHYSICIAN ASSISTANT

## 2023-10-03 PROCEDURE — 3074F SYST BP LT 130 MM HG: CPT | Performed by: PHYSICIAN ASSISTANT

## 2023-10-03 PROCEDURE — 99214 OFFICE O/P EST MOD 30 MIN: CPT | Performed by: PHYSICIAN ASSISTANT

## 2023-10-03 RX ORDER — TAMSULOSIN HYDROCHLORIDE 0.4 MG/1
0.4 CAPSULE ORAL
Qty: 30 CAPSULE | Refills: 1 | Status: SHIPPED | OUTPATIENT
Start: 2023-10-03 | End: 2023-11-27

## 2023-10-03 ASSESSMENT — FIBROSIS 4 INDEX: FIB4 SCORE: 1.91

## 2023-10-03 NOTE — ASSESSMENT & PLAN NOTE
New and unstable  Continue high-fiber diet, continue MiraLAX.  I am going to put in for referral given CT findings

## 2023-10-03 NOTE — PROGRESS NOTES
"Subjective:   Rene López is a 83 y.o. male here today for     HPI: Patient is here to discuss:  Problem   Constipation    1 week history of constipation with rectal pain.  Went to the emergency room on Wednesday night which was 6 days ago.  He had a CT of the abdomen and pelvis done and sent home on MiraLAX and has been doing MiraLAX daily.  Has now had soft brown bowel movements without any blood or mucus     Rectal Mass   Enlarged Prostate   Enteritis          Current medicines (including changes today)  Current Outpatient Medications   Medication Sig Dispense Refill    tamsulosin (FLOMAX) 0.4 MG capsule Take 1 Capsule by mouth 1/2 hour after breakfast. 30 Capsule 1    polyethylene glycol/lytes (MIRALAX) 17 g Pack Take 1 Packet by mouth 1 time a day as needed (for constipation). 30 Each 0    timolol (TIMOPTIC) 0.5 % Solution INSTILL 1 DROP INTO EACH EYE IN THE MORNING       No current facility-administered medications for this visit.     He  has no past medical history on file.  Patient has no known allergies.     Social History and Family History were reviewed and updated.    ROS   No headaches, chest pain, no shortness of breath, abdominal pain, nausea, or vomiting.  All other systems were reviewed and are negative or noted as positive in the HPI.       Objective:     /74   Pulse 64   Temp 36.5 °C (97.7 °F) (Temporal)   Resp 20   Ht 1.778 m (5' 10\")   Wt 62.8 kg (138 lb 6.4 oz)   SpO2 96%  Body mass index is 19.86 kg/m².     Physical Exam:  General: Patient appears well-nourished, well-hydrated, nontoxic  HEENT, normocephalic atraumatic, PERRLA, extraocular movements intact, nares are patent and clear  Neck: No visible masses, thyromegaly or abnormalities noted  Cardiovascular.  Sitting comfortably without visible signs of edema  Lungs: No cyanosis noted, nondyspneic  Skin: Well perfused without evidence of rash or lesions  Neurological: Cranial nerves II through XII intact, normal " gait  Musculoskeletal: Normal range of motion, normal strength and no deficit noted     Clinical Course/Lab Analysis:  IMPRESSION:        1.  Distal rectal wall thickening with slight adjacent hazy fat stranding, consider proctitis versus rectal mass. Correlate with exam.  2.  Patchy nodular infiltrates in the bilateral lung bases, appearance suggests atypical infiltrates, consider aspiration. Recommend radiographic follow-up to resolution.  3.  Mild fluid-filled prominence of small bowel suggests ileus and/or enteritis  4.  Calcified right pleural calcifications.  5.  Hepatomegaly  6.  Prostate enlargement, consider causes of prostate enlargement with additional workup as appropriate.  7.  Atherosclerosis and atherosclerotic coronary artery disease      Assessment and Plan:   The following treatment plan was discussed.  Signs and symptoms for which to return were discussed with patient at length.  Patient verbalized understanding.    Problem List Items Addressed This Visit       Abnormal CBC    Relevant Orders    CBC WITH DIFFERENTIAL    Constipation     New and unstable  Continue high-fiber diet, continue MiraLAX.  I am going to put in for referral given CT findings         Rectal mass    Relevant Orders    Referral to General Surgery    Enlarged prostate    Relevant Medications    tamsulosin (FLOMAX) 0.4 MG capsule    Other Relevant Orders    Referral to Urology    Enteritis    Relevant Orders    Comp Metabolic Panel      Patchy infiltrates seen on CT scan in the lungs.  Incidental finding.  At this time patient wishes for no further testing or evaluation.  This is reasonable    Followup:    Please note that this dictation was created using voice recognition software. I have made every reasonable attempt to correct obvious errors, but I expect that there are errors of grammar and possibly content that I did not discover before finalizing the note.

## 2023-10-05 ENCOUNTER — HOSPITAL ENCOUNTER (OUTPATIENT)
Dept: LAB | Facility: MEDICAL CENTER | Age: 83
End: 2023-10-05
Attending: PHYSICIAN ASSISTANT
Payer: MEDICARE

## 2023-10-05 DIAGNOSIS — K52.9 INFLAMMATION OF COLONIC MUCOSA: ICD-10-CM

## 2023-10-05 DIAGNOSIS — K52.9 ENTERITIS: ICD-10-CM

## 2023-10-05 DIAGNOSIS — R16.0 HEPATOMEGALIA: ICD-10-CM

## 2023-10-05 DIAGNOSIS — R79.89 ABNORMAL CBC: ICD-10-CM

## 2023-10-05 LAB
ALBUMIN SERPL BCP-MCNC: 4 G/DL (ref 3.2–4.9)
ALBUMIN/GLOB SERPL: 1.3 G/DL
ALP SERPL-CCNC: 52 U/L (ref 30–99)
ALT SERPL-CCNC: 10 U/L (ref 2–50)
ANION GAP SERPL CALC-SCNC: 11 MMOL/L (ref 7–16)
AST SERPL-CCNC: 19 U/L (ref 12–45)
BASOPHILS # BLD AUTO: 0.6 % (ref 0–1.8)
BASOPHILS # BLD: 0.03 K/UL (ref 0–0.12)
BILIRUB SERPL-MCNC: 0.4 MG/DL (ref 0.1–1.5)
BUN SERPL-MCNC: 24 MG/DL (ref 8–22)
CALCIUM ALBUM COR SERPL-MCNC: 9 MG/DL (ref 8.5–10.5)
CALCIUM SERPL-MCNC: 9 MG/DL (ref 8.4–10.2)
CHLORIDE SERPL-SCNC: 100 MMOL/L (ref 96–112)
CO2 SERPL-SCNC: 27 MMOL/L (ref 20–33)
CREAT SERPL-MCNC: 1.16 MG/DL (ref 0.5–1.4)
EOSINOPHIL # BLD AUTO: 0.02 K/UL (ref 0–0.51)
EOSINOPHIL NFR BLD: 0.4 % (ref 0–6.9)
ERYTHROCYTE [DISTWIDTH] IN BLOOD BY AUTOMATED COUNT: 46.5 FL (ref 35.9–50)
FASTING STATUS PATIENT QL REPORTED: NORMAL
GFR SERPLBLD CREATININE-BSD FMLA CKD-EPI: 61 ML/MIN/1.73 M 2
GLOBULIN SER CALC-MCNC: 3 G/DL (ref 1.9–3.5)
GLUCOSE SERPL-MCNC: 134 MG/DL (ref 65–99)
HCT VFR BLD AUTO: 40.6 % (ref 42–52)
HGB BLD-MCNC: 13.8 G/DL (ref 14–18)
IMM GRANULOCYTES # BLD AUTO: 0.02 K/UL (ref 0–0.11)
IMM GRANULOCYTES NFR BLD AUTO: 0.4 % (ref 0–0.9)
LYMPHOCYTES # BLD AUTO: 1.22 K/UL (ref 1–4.8)
LYMPHOCYTES NFR BLD: 24.5 % (ref 22–41)
MCH RBC QN AUTO: 31.7 PG (ref 27–33)
MCHC RBC AUTO-ENTMCNC: 34 G/DL (ref 32.3–36.5)
MCV RBC AUTO: 93.1 FL (ref 81.4–97.8)
MONOCYTES # BLD AUTO: 0.34 K/UL (ref 0–0.85)
MONOCYTES NFR BLD AUTO: 6.8 % (ref 0–13.4)
NEUTROPHILS # BLD AUTO: 3.35 K/UL (ref 1.82–7.42)
NEUTROPHILS NFR BLD: 67.3 % (ref 44–72)
NRBC # BLD AUTO: 0 K/UL
NRBC BLD-RTO: 0 /100 WBC (ref 0–0.2)
PLATELET # BLD AUTO: 335 K/UL (ref 164–446)
PMV BLD AUTO: 8.5 FL (ref 9–12.9)
POTASSIUM SERPL-SCNC: 4.2 MMOL/L (ref 3.6–5.5)
PROT SERPL-MCNC: 7 G/DL (ref 6–8.2)
RBC # BLD AUTO: 4.36 M/UL (ref 4.7–6.1)
SODIUM SERPL-SCNC: 138 MMOL/L (ref 135–145)
WBC # BLD AUTO: 5 K/UL (ref 4.8–10.8)

## 2023-10-05 PROCEDURE — 36415 COLL VENOUS BLD VENIPUNCTURE: CPT

## 2023-10-05 PROCEDURE — 80053 COMPREHEN METABOLIC PANEL: CPT

## 2023-10-05 PROCEDURE — 85025 COMPLETE CBC W/AUTO DIFF WBC: CPT

## 2023-10-17 ENCOUNTER — HOSPITAL ENCOUNTER (OUTPATIENT)
Dept: LAB | Facility: MEDICAL CENTER | Age: 83
End: 2023-10-17
Attending: STUDENT IN AN ORGANIZED HEALTH CARE EDUCATION/TRAINING PROGRAM
Payer: MEDICARE

## 2023-10-17 PROCEDURE — 36415 COLL VENOUS BLD VENIPUNCTURE: CPT

## 2023-10-17 PROCEDURE — 84154 ASSAY OF PSA FREE: CPT

## 2023-10-17 PROCEDURE — 84153 ASSAY OF PSA TOTAL: CPT

## 2023-10-19 LAB
PSA FREE MFR SERPL: 8 %
PSA FREE SERPL-MCNC: 184.8 NG/ML
PSA SERPL-MCNC: 2426 NG/ML (ref 0–4)

## 2023-10-25 ENCOUNTER — HOSPITAL ENCOUNTER (OUTPATIENT)
Dept: LAB | Facility: MEDICAL CENTER | Age: 83
End: 2023-10-25
Attending: STUDENT IN AN ORGANIZED HEALTH CARE EDUCATION/TRAINING PROGRAM
Payer: MEDICARE

## 2023-10-25 ENCOUNTER — HOSPITAL ENCOUNTER (OUTPATIENT)
Dept: RADIOLOGY | Facility: MEDICAL CENTER | Age: 83
End: 2023-10-25
Attending: STUDENT IN AN ORGANIZED HEALTH CARE EDUCATION/TRAINING PROGRAM
Payer: MEDICARE

## 2023-10-25 DIAGNOSIS — R97.20 ELEVATED PROSTATE SPECIFIC ANTIGEN (PSA): ICD-10-CM

## 2023-10-25 LAB
BUN SERPL-MCNC: 32 MG/DL (ref 8–22)
CREAT SERPL-MCNC: 1.02 MG/DL (ref 0.5–1.4)
GFR SERPLBLD CREATININE-BSD FMLA CKD-EPI: 72 ML/MIN/1.73 M 2
TESTOST SERPL-MCNC: 159 NG/DL (ref 175–781)

## 2023-10-25 PROCEDURE — 74177 CT ABD & PELVIS W/CONTRAST: CPT

## 2023-10-25 PROCEDURE — 82565 ASSAY OF CREATININE: CPT

## 2023-10-25 PROCEDURE — 84520 ASSAY OF UREA NITROGEN: CPT

## 2023-10-25 PROCEDURE — 700117 HCHG RX CONTRAST REV CODE 255: Performed by: STUDENT IN AN ORGANIZED HEALTH CARE EDUCATION/TRAINING PROGRAM

## 2023-10-25 PROCEDURE — 36415 COLL VENOUS BLD VENIPUNCTURE: CPT

## 2023-10-25 PROCEDURE — 84403 ASSAY OF TOTAL TESTOSTERONE: CPT

## 2023-10-25 RX ADMIN — IOHEXOL 25 ML: 240 INJECTION, SOLUTION INTRATHECAL; INTRAVASCULAR; INTRAVENOUS; ORAL at 17:46

## 2023-10-25 RX ADMIN — IOHEXOL 100 ML: 350 INJECTION, SOLUTION INTRAVENOUS at 17:47

## 2023-10-26 ENCOUNTER — OFFICE VISIT (OUTPATIENT)
Dept: MEDICAL GROUP | Facility: MEDICAL CENTER | Age: 83
End: 2023-10-26
Payer: MEDICARE

## 2023-10-26 VITALS
RESPIRATION RATE: 20 BRPM | WEIGHT: 136 LBS | DIASTOLIC BLOOD PRESSURE: 74 MMHG | HEART RATE: 70 BPM | SYSTOLIC BLOOD PRESSURE: 116 MMHG | HEIGHT: 70 IN | BODY MASS INDEX: 19.47 KG/M2 | OXYGEN SATURATION: 94 % | TEMPERATURE: 98 F

## 2023-10-26 DIAGNOSIS — R19.8 STRAINING DURING BOWEL MOVEMENTS: ICD-10-CM

## 2023-10-26 DIAGNOSIS — K62.3 RECTAL PROLAPSE: ICD-10-CM

## 2023-10-26 DIAGNOSIS — K62.89 RECTAL MASS: ICD-10-CM

## 2023-10-26 PROCEDURE — 3078F DIAST BP <80 MM HG: CPT | Performed by: PHYSICIAN ASSISTANT

## 2023-10-26 PROCEDURE — 99214 OFFICE O/P EST MOD 30 MIN: CPT | Performed by: PHYSICIAN ASSISTANT

## 2023-10-26 PROCEDURE — 3074F SYST BP LT 130 MM HG: CPT | Performed by: PHYSICIAN ASSISTANT

## 2023-10-26 RX ORDER — HYDROCORTISONE ACETATE 25 MG/1
25 SUPPOSITORY RECTAL EVERY 12 HOURS
Qty: 6 SUPPOSITORY | Refills: 1 | Status: SHIPPED | OUTPATIENT
Start: 2023-10-26 | End: 2023-11-01

## 2023-10-26 ASSESSMENT — FIBROSIS 4 INDEX: FIB4 SCORE: 1.52

## 2023-10-26 NOTE — ASSESSMENT & PLAN NOTE
New and unstable  Rectal mass seen on CT.  Patient sees [facility] a few days ago and they were not concerned

## 2023-10-26 NOTE — ASSESSMENT & PLAN NOTE
New and unstable   Exam was unremarkable for hemorrhoids.  I Esme send to general surgery for consultation and send Anusol to the pharmacy.  Discussed 4 ounces of warm prune juice daily continue stool softener and MiraLAX daily

## 2023-10-26 NOTE — PROGRESS NOTES
"Subjective:   Rene López is a 83 y.o. male here today for     HPI: Patient is here to discuss:  Problem   Rectal Prolapse    A few week history of straining with bowel movements and having to push back in his rectum.  Denies blood or mucus or fevers.  Is using Dulcolax and MiraLAX which helps.     Rectal Mass    Rectal mass seen on CT.  Patient sees [facility] a few days ago and they were not concerned            Current medicines (including changes today)  Current Outpatient Medications   Medication Sig Dispense Refill    hydrocortisone (ANUSOL-HC) 25 MG Suppos Insert 1 Suppository into the rectum every 12 hours for 6 days. 6 Suppository 1    tamsulosin (FLOMAX) 0.4 MG capsule Take 1 Capsule by mouth 1/2 hour after breakfast. 30 Capsule 1    polyethylene glycol/lytes (MIRALAX) 17 g Pack Take 1 Packet by mouth 1 time a day as needed (for constipation). 30 Each 0    timolol (TIMOPTIC) 0.5 % Solution INSTILL 1 DROP INTO EACH EYE IN THE MORNING       No current facility-administered medications for this visit.     He  has no past medical history on file.  Patient has no known allergies.     Social History and Family History were reviewed and updated.    ROS   No headaches, chest pain, no shortness of breath, abdominal pain, nausea, or vomiting.  All other systems were reviewed and are negative or noted as positive in the HPI.       Objective:     /74   Pulse 70   Temp 36.7 °C (98 °F) (Temporal)   Resp 20   Ht 1.778 m (5' 10\")   Wt 61.7 kg (136 lb)   SpO2 94%  Body mass index is 19.51 kg/m².     Physical Exam:  General: Patient appears well-nourished, well-hydrated, nontoxic  HEENT, normocephalic atraumatic, PERRLA, extraocular movements intact, nares are patent and clear  Neck: No visible masses, thyromegaly or abnormalities noted  Cardiovascular.  Sitting comfortably without visible signs of edema  Lungs: No cyanosis noted, nondyspneic  Skin: Well perfused without evidence of rash or " lesions  Neurological: Cranial nerves II through XII intact, normal gait  Musculoskeletal: Normal range of motion, normal strength and no deficit noted     Clinical Course/Lab Analysis:        Assessment and Plan:   The following treatment plan was discussed.  Signs and symptoms for which to return were discussed with patient at length.  Patient verbalized understanding.    Problem List Items Addressed This Visit       Rectal mass     New and unstable  Rectal mass seen on CT.  Patient sees [facility] a few days ago and they were not concerned         Relevant Orders    Referral to Intake Oncology Coordinator    Rectal prolapse     New and unstable   Exam was unremarkable for hemorrhoids.  I Seme send to general surgery for consultation and send Anusol to the pharmacy.  Discussed 4 ounces of warm prune juice daily continue stool softener and MiraLAX daily         Relevant Orders    Referral to General Surgery    Referral to Intake Oncology Coordinator     Other Visit Diagnoses       Straining during bowel movements        Relevant Medications    hydrocortisone (ANUSOL-HC) 25 MG Suppos    Other Relevant Orders    Referral to Intake Oncology Coordinator               Followup: I put in referral to [facility] to better assess rectal mass. Unclear regarding [facility] consultation.     Please note that this dictation was created using voice recognition software. I have made every reasonable attempt to correct obvious errors, but I expect that there are errors of grammar and possibly content that I did not discover before finalizing the note.

## 2023-11-07 ENCOUNTER — HOSPITAL ENCOUNTER (OUTPATIENT)
Dept: HEMATOLOGY ONCOLOGY | Facility: MEDICAL CENTER | Age: 83
End: 2023-11-07
Attending: NURSE PRACTITIONER
Payer: MEDICARE

## 2023-11-07 VITALS
OXYGEN SATURATION: 97 % | WEIGHT: 138.78 LBS | TEMPERATURE: 97.2 F | HEART RATE: 65 BPM | HEIGHT: 69 IN | SYSTOLIC BLOOD PRESSURE: 122 MMHG | DIASTOLIC BLOOD PRESSURE: 64 MMHG | BODY MASS INDEX: 20.56 KG/M2 | RESPIRATION RATE: 12 BRPM

## 2023-11-07 DIAGNOSIS — M53.3 SACRAL MASS: ICD-10-CM

## 2023-11-07 PROCEDURE — 99204 OFFICE O/P NEW MOD 45 MIN: CPT | Performed by: NURSE PRACTITIONER

## 2023-11-07 PROCEDURE — 99212 OFFICE O/P EST SF 10 MIN: CPT | Performed by: NURSE PRACTITIONER

## 2023-11-07 ASSESSMENT — ENCOUNTER SYMPTOMS
CONSTIPATION: 0
DIARRHEA: 0
PALPITATIONS: 0
WEIGHT LOSS: 0
VOMITING: 0
NAUSEA: 0
FEVER: 0
COUGH: 0
CHILLS: 0

## 2023-11-07 ASSESSMENT — FIBROSIS 4 INDEX: FIB4 SCORE: 1.52

## 2023-11-07 ASSESSMENT — PAIN SCALES - GENERAL: PAINLEVEL: NO PAIN

## 2023-11-07 NOTE — Clinical Note
Greg Carreno,  I saw your patient today.  It looks like urology [facility] has a whole plan in place and I am not going to step on their toes at this time.  I did inform the family that I think he needs a biopsy of the sacral mass but according to the niece she stated that they were very specific on what they wanted to do and are in agreement to follow through with what urology [facility] has plan at this time.  He is seeing Dr. Caraballo on 11/21 to go over the MRI results and then they will discuss biopsy.  They do have my information and can reach out to me if they need help ordering the additional questions. Heaven

## 2023-11-07 NOTE — PROGRESS NOTES
Subjective     Rene López is a 83 y.o. male who presents with New Patient (IOC/ SCP/Rectal mass )          HPI    Patient referred to me, Intake Oncology Coordinator by his PCP Ev Watson PA-C for sacral mass.  Patient is accompanied by his niece and nephew for today's visit.    Patient returned home from a visit in [place] approximately at the end of August or September sometime he had began to experience some difficulty with both urination and bowel movement.  He presented to the emergency department on 9/28/2023 and he was given medication to help him have a bowel movement and urinate.  Since then he has been on tamsulosin which has been beneficial for his urination and he is having normal bowel movements daily.  At the time of the ER he had a CT scan completed of his abdomen and pelvis and was told that he had an inflamed colon and possibly a tumor or hemorrhoids.  That CT abdomen/pelvis with contrast on 9/27/2023 showed distal rectal wall thickening with slight adjacent hazy fat stranding.  There was patchy nodular infiltrates in the bilateral lung bases, suggesting of an atypical infiltrates.  He had mild fluid-filled prominence of small bowel suggestive of ileus and/or enteritis.  He also had some calcified right pleural calcifications, hepatomegaly.  Prostate enlargement, and atherosclerosis and atherosclerotic coronary artery disease.    Patient was sent to see a rectal surgeon, Dr. Paul.  Based on the medical records from that visit it was informed that it did not look like a rectal cancer and that he needed to follow-up with his urologist which he had already been seen by.  Patient was seen by urology initially on 10/10/2023.    Patient did have another CT abdomen/pelvis with contrast on 10/25/2023 which noted a large destructive sacral soft tissue mass consistent with malignancy.  This was mentioned that it is amenable to CT-guided biopsy.  He also had prostatomegaly as well as right  pleural thickening and calcifications.  I did personally review the imaging report and images in detail, and reviewed the report in detail with the patient and his family today.    When seen by urology he did have a PSA completed which was significantly elevated at 2426 completed on 10/17/2023.  According to the patient and his family he has a follow-up MRI of the pelvis/rectal scheduled for 11/18 and he has a follow-up visit with urologist Dr. Caraballo on 11/21.  According to the patient's niece who is a retired nurse practitioner, she was informed that they wanted the MRI before doing any biopsy to get better information.    Clinically patient is doing much better than he was when he initially went to the ER at the end of September 2023.  He did have some significant weight loss of approximately 20-25 pounds but has been over the course of about 4-5 months.  He stated that he just noticed he did not have much of an appetite at that time but he is eating very well at this time and gaining back.  His energy is much better as well today he denies any chest pain, heart palpitations or coughing, wheezing or shortness of breath.    See past medical and surgical history below.    Patient is a very remote former smoker.  He quit smoking approximately 30 years, stated he smoked maybe about 20 years total.    Patient does have a family history of colon cancer in his mother.    No Known Allergies  Current Outpatient Medications on File Prior to Encounter   Medication Sig Dispense Refill    tamsulosin (FLOMAX) 0.4 MG capsule Take 1 Capsule by mouth 1/2 hour after breakfast. 30 Capsule 1    polyethylene glycol/lytes (MIRALAX) 17 g Pack Take 1 Packet by mouth 1 time a day as needed (for constipation). 30 Each 0    timolol (TIMOPTIC) 0.5 % Solution INSTILL 1 DROP INTO EACH EYE IN THE MORNING       No current facility-administered medications on file prior to encounter.     Past Medical History:   Diagnosis Date    DVT (deep  "venous thrombosis) (HCC) 1995    After falling off al SYSTRANer     Past Surgical History:   Procedure Laterality Date    INGUINAL HERNIA REPAIR  06/25/2008    Performed by AYUSH RIVAS at SURGERY SAME DAY [facility] ORS    KNEE REPLACEMENT, TOTAL Left      Family History   Problem Relation Age of Onset    Cancer Mother         Colon     Social History     Socioeconomic History    Marital status: Single   Tobacco Use    Smoking status: Former     Types: Cigarettes    Smokeless tobacco: Never    Tobacco comments:     Quit about 1993 - smoked for about 20 years    Vaping Use    Vaping Use: Never used   Substance and Sexual Activity    Alcohol use: Not Currently     Comment: quit about 20 years ago    Drug use: Never   Social History Narrative    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   Respiratory:  Negative for cough.    Cardiovascular:  Negative for chest pain and palpitations.   Gastrointestinal:  Negative for constipation, diarrhea, nausea and vomiting.   Genitourinary:  Negative for dysuria.              Objective     /64   Pulse 65   Temp 36.2 °C (97.2 °F) (Temporal)   Resp 12   Ht 1.74 m (5' 8.5\")   Wt 62.9 kg (138 lb 12.5 oz)   SpO2 97%   BMI 20.79 kg/m²      Physical Exam  Vitals reviewed.   Constitutional:       General: He is not in acute distress.     Appearance: Normal appearance. He is not diaphoretic.   Cardiovascular:      Rate and Rhythm: Normal rate and regular rhythm.      Heart sounds: Murmur heard.      No friction rub. No gallop.   Pulmonary:      Effort: Pulmonary effort is normal. No respiratory distress.      Breath sounds: Normal breath sounds. No wheezing.   Neurological:      Mental Status: He is alert and oriented to person, place, and time.   Psychiatric:         Mood and Affect: Mood normal.         Behavior: Behavior normal.            CT-ABDOMEN-PELVIS " WITH    Result Date: 10/25/2023  10/25/2023 5:35 PM HISTORY/REASON FOR EXAM: . Elevated PSA. History of prostate cancer TECHNIQUE/EXAM DESCRIPTION:   CT scan of the abdomen and pelvis with contrast. Contrast-enhanced helical scanning was obtained from the diaphragmatic domes through the pubic symphysis following the bolus administration of nonionic contrast without complication. 80 mL of Omnipaque 350 nonionic contrast was administered without complication. Low dose optimization technique was utilized for this CT exam including automated exposure control and adjustment of the mA and/or kV according to patient size. COMPARISON: 9/27/2023 FINDINGS: Lower Chest: Right pleural thickening with dystrophic calcification again noted. Aortic valve calcification partially visualized Liver: Normal. Spleen: Unremarkable. Pancreas: Unremarkable. Gallbladder: No calcified stones. Biliary: Nondilated. Adrenal glands: Normal. Kidneys: Unremarkable without hydronephrosis. Bowel: No obstruction or acute inflammation. Lymph nodes: No adenopathy. Vasculature: Atherosclerosis. Peritoneum: Unremarkable without ascites. Musculoskeletal: There is a large destructive soft tissue mass which involves much of the central sacrum extending into the mid and lower sacral alar with significant soft tissue mass in the sacral spinal canal and foramina and in the presacral soft tissues. The mass measures about 9 cm craniocaudal and about 5.3 cm AP and 7 cm transverse. Degenerative changes.. Pelvis: Mild to moderate prostatomegaly. Urinary bladder is decompressed..     1.  Large destructive sacral soft tissue mass as described above consistent with malignancy. It could be metastatic versus possibly plasmacytoma. This would be amenable to CT-guided biopsy. 2.  Prostatomegaly. 3.  Right pleural thickening and calcification again noted.        Latest Reference Range & Units 10/17/23 09:13   PSA Total 0.0 - 4.0 ng/mL 2426.0 (H)                Assessment &  Plan       1. Sacral mass                I discussed the imaging in detail with the patient and his family today.  I discussed the concerning findings for the sacral mass as well as the elevated PSA.  I discussed that my recommendation would be to proceed with biopsy of the sacral mass however, patient is already being currently worked up for this issue by urology and I do not want to go against what their plans are.  And, according to the niece who is a very reliable retired nurse practitioner, they did not want to do biopsy immediately until after they got the MRI which is currently scheduled for 11/18/2023 and he is seeing Dr. Caraballo on 11/21/2023 to discuss the results and further recommendations.    Therefore, I will not proceed with any work-up and I will defer to the urology group at this time.  I informed the patient and family that I am available if any questions or concerns but I will at this point defer to urology.    I have requested the patient if we can get the results from Dr. Caraballo's visit today stay updated and I will also update his primary care provider.      Please note that this dictation was created using voice recognition software. I have made every reasonable attempt to correct obvious errors, but I expect that there are errors of grammar and possibly content that I did not discover before finalizing the note.

## 2023-11-07 NOTE — ADDENDUM NOTE
Encounter addended by: Korinne Mitchell, Med Ass't on: 11/7/2023 1:25 PM   Actions taken: Charge Capture section accepted

## 2023-11-18 ENCOUNTER — HOSPITAL ENCOUNTER (EMERGENCY)
Facility: MEDICAL CENTER | Age: 83
End: 2023-11-18
Attending: EMERGENCY MEDICINE
Payer: MEDICARE

## 2023-11-18 ENCOUNTER — HOSPITAL ENCOUNTER (OUTPATIENT)
Dept: RADIOLOGY | Facility: MEDICAL CENTER | Age: 83
End: 2023-11-18
Attending: STUDENT IN AN ORGANIZED HEALTH CARE EDUCATION/TRAINING PROGRAM
Payer: MEDICARE

## 2023-11-18 VITALS
HEART RATE: 64 BPM | BODY MASS INDEX: 20.44 KG/M2 | HEIGHT: 69 IN | WEIGHT: 138 LBS | SYSTOLIC BLOOD PRESSURE: 99 MMHG | OXYGEN SATURATION: 95 % | RESPIRATION RATE: 16 BRPM | TEMPERATURE: 97.2 F | DIASTOLIC BLOOD PRESSURE: 72 MMHG

## 2023-11-18 VITALS
SYSTOLIC BLOOD PRESSURE: 97 MMHG | DIASTOLIC BLOOD PRESSURE: 74 MMHG | HEART RATE: 72 BPM | RESPIRATION RATE: 33 BRPM | OXYGEN SATURATION: 96 %

## 2023-11-18 DIAGNOSIS — R01.1 HEART MURMUR: ICD-10-CM

## 2023-11-18 DIAGNOSIS — I95.1 ORTHOSTATIC SYNCOPE: ICD-10-CM

## 2023-11-18 DIAGNOSIS — R97.20 ELEVATED PROSTATE SPECIFIC ANTIGEN (PSA): ICD-10-CM

## 2023-11-18 LAB
ALBUMIN SERPL BCP-MCNC: 3.9 G/DL (ref 3.2–4.9)
ALBUMIN/GLOB SERPL: 1.6 G/DL
ALP SERPL-CCNC: 47 U/L (ref 30–99)
ALT SERPL-CCNC: 44 U/L (ref 2–50)
ANION GAP SERPL CALC-SCNC: 12 MMOL/L (ref 7–16)
AST SERPL-CCNC: 30 U/L (ref 12–45)
BASOPHILS # BLD AUTO: 0.5 % (ref 0–1.8)
BASOPHILS # BLD: 0.03 K/UL (ref 0–0.12)
BILIRUB SERPL-MCNC: 0.5 MG/DL (ref 0.1–1.5)
BUN SERPL-MCNC: 34 MG/DL (ref 8–22)
CALCIUM ALBUM COR SERPL-MCNC: 9.1 MG/DL (ref 8.5–10.5)
CALCIUM SERPL-MCNC: 9 MG/DL (ref 8.5–10.5)
CHLORIDE SERPL-SCNC: 101 MMOL/L (ref 96–112)
CO2 SERPL-SCNC: 25 MMOL/L (ref 20–33)
CREAT SERPL-MCNC: 1.02 MG/DL (ref 0.5–1.4)
EKG IMPRESSION: NORMAL
EOSINOPHIL # BLD AUTO: 0.09 K/UL (ref 0–0.51)
EOSINOPHIL NFR BLD: 1.4 % (ref 0–6.9)
ERYTHROCYTE [DISTWIDTH] IN BLOOD BY AUTOMATED COUNT: 47.7 FL (ref 35.9–50)
GFR SERPLBLD CREATININE-BSD FMLA CKD-EPI: 72 ML/MIN/1.73 M 2
GLOBULIN SER CALC-MCNC: 2.4 G/DL (ref 1.9–3.5)
GLUCOSE BLD STRIP.AUTO-MCNC: 146 MG/DL (ref 65–99)
GLUCOSE SERPL-MCNC: 147 MG/DL (ref 65–99)
HCT VFR BLD AUTO: 37.5 % (ref 42–52)
HGB BLD-MCNC: 13.1 G/DL (ref 14–18)
IMM GRANULOCYTES # BLD AUTO: 0.02 K/UL (ref 0–0.11)
IMM GRANULOCYTES NFR BLD AUTO: 0.3 % (ref 0–0.9)
LYMPHOCYTES # BLD AUTO: 1.67 K/UL (ref 1–4.8)
LYMPHOCYTES NFR BLD: 25.9 % (ref 22–41)
MCH RBC QN AUTO: 32.7 PG (ref 27–33)
MCHC RBC AUTO-ENTMCNC: 34.9 G/DL (ref 32.3–36.5)
MCV RBC AUTO: 93.5 FL (ref 81.4–97.8)
MONOCYTES # BLD AUTO: 0.45 K/UL (ref 0–0.85)
MONOCYTES NFR BLD AUTO: 7 % (ref 0–13.4)
NEUTROPHILS # BLD AUTO: 4.19 K/UL (ref 1.82–7.42)
NEUTROPHILS NFR BLD: 64.9 % (ref 44–72)
NRBC # BLD AUTO: 0 K/UL
NRBC BLD-RTO: 0 /100 WBC (ref 0–0.2)
PLATELET # BLD AUTO: 215 K/UL (ref 164–446)
PMV BLD AUTO: 8.6 FL (ref 9–12.9)
POTASSIUM SERPL-SCNC: 4.4 MMOL/L (ref 3.6–5.5)
PROT SERPL-MCNC: 6.3 G/DL (ref 6–8.2)
RBC # BLD AUTO: 4.01 M/UL (ref 4.7–6.1)
SODIUM SERPL-SCNC: 138 MMOL/L (ref 135–145)
TROPONIN T SERPL-MCNC: 25 NG/L (ref 6–19)
TROPONIN T SERPL-MCNC: 26 NG/L (ref 6–19)
WBC # BLD AUTO: 6.5 K/UL (ref 4.8–10.8)

## 2023-11-18 PROCEDURE — 93005 ELECTROCARDIOGRAM TRACING: CPT | Performed by: EMERGENCY MEDICINE

## 2023-11-18 PROCEDURE — 700111 HCHG RX REV CODE 636 W/ 250 OVERRIDE (IP): Mod: JZ | Performed by: RADIOLOGY

## 2023-11-18 PROCEDURE — A9579 GAD-BASE MR CONTRAST NOS,1ML: HCPCS

## 2023-11-18 PROCEDURE — 80053 COMPREHEN METABOLIC PANEL: CPT

## 2023-11-18 PROCEDURE — 36415 COLL VENOUS BLD VENIPUNCTURE: CPT

## 2023-11-18 PROCEDURE — 84484 ASSAY OF TROPONIN QUANT: CPT

## 2023-11-18 PROCEDURE — 85025 COMPLETE CBC W/AUTO DIFF WBC: CPT

## 2023-11-18 PROCEDURE — 82962 GLUCOSE BLOOD TEST: CPT

## 2023-11-18 PROCEDURE — 700117 HCHG RX CONTRAST REV CODE 255

## 2023-11-18 PROCEDURE — 72197 MRI PELVIS W/O & W/DYE: CPT

## 2023-11-18 PROCEDURE — 700105 HCHG RX REV CODE 258: Performed by: STUDENT IN AN ORGANIZED HEALTH CARE EDUCATION/TRAINING PROGRAM

## 2023-11-18 PROCEDURE — 700105 HCHG RX REV CODE 258: Performed by: EMERGENCY MEDICINE

## 2023-11-18 PROCEDURE — 99284 EMERGENCY DEPT VISIT MOD MDM: CPT

## 2023-11-18 RX ORDER — SODIUM CHLORIDE, SODIUM LACTATE, POTASSIUM CHLORIDE, AND CALCIUM CHLORIDE .6; .31; .03; .02 G/100ML; G/100ML; G/100ML; G/100ML
1000 INJECTION, SOLUTION INTRAVENOUS ONCE
Status: COMPLETED | OUTPATIENT
Start: 2023-11-18 | End: 2023-11-18

## 2023-11-18 RX ORDER — SODIUM CHLORIDE 9 MG/ML
1000 INJECTION, SOLUTION INTRAVENOUS ONCE
Status: COMPLETED | OUTPATIENT
Start: 2023-11-18 | End: 2023-11-18

## 2023-11-18 RX ADMIN — SODIUM CHLORIDE, POTASSIUM CHLORIDE, SODIUM LACTATE AND CALCIUM CHLORIDE 1000 ML: 600; 310; 30; 20 INJECTION, SOLUTION INTRAVENOUS at 18:15

## 2023-11-18 RX ADMIN — GADOTERIDOL 15 ML: 279.3 INJECTION, SOLUTION INTRAVENOUS at 18:19

## 2023-11-18 RX ADMIN — GLUCAGON 1 MG: 1 INJECTION, POWDER, LYOPHILIZED, FOR SOLUTION INTRAMUSCULAR; INTRAVENOUS at 15:25

## 2023-11-18 RX ADMIN — SODIUM CHLORIDE 1000 ML: 9 INJECTION, SOLUTION INTRAVENOUS at 18:14

## 2023-11-18 ASSESSMENT — FIBROSIS 4 INDEX: FIB4 SCORE: 1.52

## 2023-11-19 NOTE — ED PROVIDER NOTES
ED Provider Note    Scribed for Dr. Lorenzo by Verna Otoole. 11/18/2023,  5:20 PM.      CHIEF COMPLAINT  Chief Complaint   Patient presents with    Syncope     PT was at hospital for MRI when he had a syncopal episode witnessed by staff, no fall or seizure life activity. PT was initially found to be hypotensive by staff, BP stabilized upon arrival to ED. PT denies CP or SOB. FSBS 146.        EXTERNAL RECORDS REVIEWED  Inpatient Notes Patient last seen in the [facility] ED on 9/27/2023 for constipation and rectal pain. At this time a possible rectal mass and prostate enlargement was found on CT-abdomen-pelvis. He presented earlier this morning to [address] [place] [address] for an MRI.    [facility]  LIMITATION TO HISTORY   Select: : None  OUTSIDE HISTORIAN(S):  Family at bedside and helps gives pertinent history    Rene López is a 83 y.o. male who presents to the Emergency Department as a code assist following a syncopal episode while obtaining an MRI. He was obtaining an MRI to rule out Prostate cancer secondary to prostate enlargement. While there he had a witnessed syncopal episode, but did not hit his head or endure seizure like activity. He was initially found to be hypotensive, but his blood pressure is 117/68 during initial evaluation. He states that he believed he fasted for too long prior to the MRI. He has not been eating as much for the past two days. He believes that he passed out due to standing up too fast after the MRI, but his family at bedside states he had multiple syncopal episodes during the MRI. He denies any chest pain or shortness of breath prior to, or after the fall. He does have a prior history of DVT. He denies any current history of cigarette use, drug use, or alcohol use. Patient takes Flomax 1x daily.     REVIEW OF SYSTEMS  See HPI for further details. All other systems are negative.     PAST MEDICAL HISTORY     Past Medical History:   Diagnosis Date    DVT (deep venous thrombosis) (HCC) 1995  "   After falling off al donna    Patient denies medical problems        SURGICAL HISTORY  Past Surgical History:   Procedure Laterality Date    INGUINAL HERNIA REPAIR  06/25/2008    Performed by AYUSH RIVAS at SURGERY SAME DAY [facility] ORS    KNEE REPLACEMENT, TOTAL Left        FAMILY HISTORY  Family History   Problem Relation Age of Onset    Cancer Mother         Colon       SOCIAL HISTORY    reports that he has quit smoking. His smoking use included cigarettes. He has never used smokeless tobacco. He reports that he does not currently use alcohol. He reports that he does not use drugs.    CURRENT MEDICATIONS  Home Medications       Reviewed by Jodi Mauricio R.N. (Registered Nurse) on 11/18/23 at 1700  Med List Status: Complete     Medication Last Dose Status   polyethylene glycol/lytes (MIRALAX) 17 g Pack  Active   tamsulosin (FLOMAX) 0.4 MG capsule  Active   timolol (TIMOPTIC) 0.5 % Solution  Active             ALLERGIES  No Known Allergies    PHYSICAL EXAM  VITAL SIGNS: /61   Pulse 73   Temp 36.2 °C (97.2 °F) (Temporal)   Resp (!) 21   Ht 1.753 m (5' 9\")   Wt 62.6 kg (138 lb)   SpO2 93%   BMI 20.38 kg/m²   Gen: Alert, no acute distress  HEENT: ATNC  Eyes: PERRL, EOMI, normal conjunctiva  Neck: trachea midline  Resp: no respiratory distress, CTAB. No wheezes or crackles  CV: 2/6 systolic ejection murmur at the right sternal border. No JVD, RRR. No R/G. Equal radial pulses  Abd: non-distended, non-tender  Ext: No deformities. No pedal edema, no calf tenderness  Psych: normal mood  Neuro: speech fluent     DIAGNOSTIC STUDIES / PROCEDURES  EKG  I independently interpreted this EKG.  Results for orders placed or performed during the hospital encounter of 11/18/23   EKG   Result Value Ref Range    Report       [facility] Emergency Dept.    Test Date:  2023-11-18  Pt Name:    YANELIS RAMIRESSACHIN                   Department: ER  MRN:        [id number]                      Room:       [facility]" 14  Gender:     Male                         Technician: [id number]  :        [id number]                   Requested By:ER TRIAGE PROTOCOL  Order #:    [id number]                    Reading MD: Ramón Lorenzo    Measurements  Intervals                                Axis  Rate:       51                           P:          17  SC:         229                          QRS:        -49  QRSD:       98                           T:          55  QT:         433  QTc:        399    Interpretive Statements  Sinus bradycardia  Prolonged SC interval  Left anterior fascicular block  Borderline low voltage, extremity leads  Baseline wander in lead(s) V1  Compared to ECG 2008 10:25:08  First degree AV block now present  Left anterior fascicular block now present  Intraventricular conduction delay no longer present   Electronically Signed On 2023 17:35:35 PST by Ramón Lorenzo         LABS  Labs Reviewed   CBC WITH DIFFERENTIAL - Abnormal; Notable for the following components:       Result Value    RBC 4.01 (*)     Hemoglobin 13.1 (*)     Hematocrit 37.5 (*)     MPV 8.6 (*)     All other components within normal limits    Narrative:     Biotin intake of greater than 5 mg per day may interfere with  troponin levels, causing false low values.   COMP METABOLIC PANEL - Abnormal; Notable for the following components:    Glucose 147 (*)     Bun 34 (*)     All other components within normal limits    Narrative:     Biotin intake of greater than 5 mg per day may interfere with  troponin levels, causing false low values.   TROPONIN - Abnormal; Notable for the following components:    Troponin T 25 (*)     All other components within normal limits    Narrative:     Biotin intake of greater than 5 mg per day may interfere with  troponin levels, causing false low values.   TROPONIN - Abnormal; Notable for the following components:    Troponin T 26 (*)     All other components within normal limits    Narrative:     Biotin intake of greater  than 5 mg per day may interfere with  troponin levels, causing false low values.   POCT GLUCOSE DEVICE RESULTS - Abnormal; Notable for the following components:    POC Glucose, Blood 146 (*)     All other components within normal limits   ESTIMATED GFR    Narrative:     Biotin intake of greater than 5 mg per day may interfere with  troponin levels, causing false low values.       COURSE & MEDICAL DECISION MAKING  Pertinent Labs & Imaging studies were reviewed. (See chart for details)    ED Observation Status? Yes  Patient admitted to ED observation at 5:20 PM on 11/18/2023    Observation plan: labs and IV fluid hydration    After observation the patient is stable and will be discharged home upon his request  Patient discharged from ED Observation at 6:36 PM on 11/18/2023      5:20 PM Patient seen and examined at bedside by IM resident. During examination, orthostatics were performed. Upon standing blood pressure dropped from 126/86 to 99/72 indicating positive orthostatics. Discussed plan of care with the patient. Patient verbalizes understanding and agreement to this plan of care.     5:43 PM- Discussed patient case with IM resident.     6:31 PM- Patient evaluated by me at bedside at this time. Patient denies having an echocardiogram in the past. Discussed with him that he would need to undergo one soon, but not absolutely necessary to be admitted tonight if the patient does not want to. Patient does not want to stay in the hospital tonight to undergo an echo, and would prefer to obtain one out-patient. Discussed Troponin within normal homeostatic range for himself. Discussed plan for discharge, including taking his Flomax before bed. I advised the patient to follow-up with Cardiology as soon as possible, and to return to the [facility] ED with any new or worsening symptoms, including syncope or lightheadedness. Patient was given the opportunity for questions. I addressed all questions or concerns at this time and they  verbalize agreement to the plan of care.     INITIAL ASSESSMENT AND PLAN  Medical Decision Making: Patient presents with an episode of syncope in the setting of going from a laying position to sitting position.  Patient.  He is on tamsulosin, which can increase risk of orthostatic hypotension.    Patient did have a slightly elevated troponin, however delta troponin unchanged, no evidence of ACS.  Low clinical suspicion for DVT/PE as the patient is not hypoxemic, no tachycardia, no clinical signs of DVT, no pleuritic chest pain.    EKG demonstrates no high risk features for cardiac syncope, including ischemia, high-grade heart block, Brugada syndrome, Claudio-Parkinson-White syndrome, arrhythmogenic right ventricular dysplasia, long QT, short QT, hypertrophic obstructive cardiomyopathy.    The patient does have a chronic heart murmur that I am somewhat concerned for aortic stenosis.  In the setting of syncope, I did discuss with the patient as well as his family admission for rapid work-up as I cannot ensure that this is not contributing to his syncope and the potential for worsening syncope or recurrent syncope in the near future.  The family members are a nurse and a nurse practitioner.  They knowledge the uncertainty, and using shared decision-making, the patient declines hospitalization, will obtain outpatient follow-up with his primary care provider and is advised to obtain an echocardiogram as soon as possible.    There is no report of trauma to suggest traumatic brain injury, spinal injury, torso injury    ADDITIONAL PROBLEM LIST AND DISPOSITION  Heart murmur: Patient advised to follow-up with his primary care provider for echocardiogram  Elevated troponin: Stable, no evidence of acute ischemia.  Likely baseline.  Elevated BUN to creatinine ratio: Suggest dehydration.  Patient provided with IV fluids.    I have discussed management of the patient with the following medical professionals:  None    Escalation of  care considered, and ultimately not performed: after discussion with the patient / family, they have elected to decline an escalation in care.       Decision tools and prescription drugs considered including, but not limited to: HEART Score 3 .    Hydration: Patient received IV fluids for hypotension and dehydration . Oral hydration alone was not sufficient. After IV fluids patient is mildly improved .      The patient will return for new or worsening symptoms and is stable at the time of discharge.    The patient is referred to a primary physician for blood pressure management, diabetic screening, and for all other preventative health concerns.      DISPOSITION:  Patient will be discharged home in stable condition.    FOLLOW UP:  Ev Watson P.A.-C.  [address]  [address]  [facility] [id number] [phone]    Schedule an appointment as soon as possible for a visit       [facility], Emergency Dept  [address] [facility] [address] [phone]    If symptoms worsen          FINAL IMPRESSION  1. Orthostatic syncope    2. Heart murmur             Verna OLSON (Denise), am scribing for, and in the presence of, Ramón Lorenzo M.D..    Electronically signed by: Verna Otoole (Denise), 11/18/2023    IRamón M.D. personally performed the services described in this documentation, as scribed by Verna Otoole in my presence, and it is both accurate and complete.    The note accurately reflects work and decisions made by me.  Ramón Lorenzo M.D.  11/18/2023  8:19 PM      This dictation was created using voice recognition software. The accuracy of the dictation is limited to the abilities of the software. I expect there may be some errors of grammar and possibly content. The nursing notes were reviewed and certain aspects of this information were incorporated into this note.

## 2023-11-19 NOTE — PROGRESS NOTES
"MRI NURSING NOTE:      Arrived to find MRI technician Gen standing next to the patient who was very pale, disoriented and lethargic sitting up on MRI bed. Patient responded to verbal commands but was physically limp. Patient had just completed MRI Prostate with IV contrast. Patient received 1 mg Glucagon at the start of the procedure IVP. Patient was alert and oriented x 4 at start of procedure. Patient placed in wheelchair and brought back to patient assessment room in MRI. Patient placed on cardiac monitor and shows SB at a rate of 44bpm with initial bp in upper 40's systolic. Patient did not loose consciousness during this time. Per MRI tech Gen, \"patient lost consciousness several 3-5 times for a few seconds at time and would wake up and did not know his name or where he was\". Code assist called and 2nd IV placed in patients left AC 18g. Patient report given to Code team upon arrival and patient family in room with patient. Patient care transferred to code team and patient transported to ER for further evaluation. Just prior to patient being transported to ER via code team, patient stated that he has not had anything to eat or drink in a couple of days.   "

## 2023-11-19 NOTE — ED TRIAGE NOTES
"Chief Complaint   Patient presents with    Syncope     PT was at hospital for MRI when he had a syncopal episode witnessed by staff, no fall or seizure life activity. PT was initially found to be hypotensive by staff, BP stabilized upon arrival to ED. PT denies CP or SOB. FSBS 146.      PT was activated as code assist and brought immediately to ED.    /61   Pulse 73   Temp 36.2 °C (97.2 °F) (Temporal)   Resp (!) 21   Ht 1.753 m (5' 9\")   Wt 62.6 kg (138 lb)   SpO2 93%   BMI 20.38 kg/m²     "

## 2023-11-23 DIAGNOSIS — N40.0 ENLARGED PROSTATE: ICD-10-CM

## 2023-11-23 NOTE — TELEPHONE ENCOUNTER
Received request via: Pharmacy    Was the patient seen in the last year in this department? Yes    Does the patient have an active prescription (recently filled or refills available) for medication(s) requested? yes    Does the patient have [facility] and need 100 day supply (blood pressure, diabetes and cholesterol meds only)? Medication is not for cholesterol, blood pressure or diabetes   Requested Prescriptions     Pending Prescriptions Disp Refills    tamsulosin (FLOMAX) 0.4 MG capsule [Pharmacy Med Name: Tamsulosin HCl 0.4 MG Oral Capsule] 30 Capsule 0     Sig: TAKE 1 CAPSULE BY MOUTH ONCE DAILY 30  MINUTES  AFTER  BREAKFAST

## 2023-11-27 RX ORDER — TAMSULOSIN HYDROCHLORIDE 0.4 MG/1
CAPSULE ORAL
Qty: 100 CAPSULE | Refills: 1 | Status: SHIPPED | OUTPATIENT
Start: 2023-11-27

## 2023-12-04 ENCOUNTER — HOSPITAL ENCOUNTER (OUTPATIENT)
Dept: RADIOLOGY | Facility: MEDICAL CENTER | Age: 83
End: 2023-12-04
Attending: UROLOGY
Payer: MEDICARE

## 2023-12-04 DIAGNOSIS — N40.2 NODULAR PROSTATE WITHOUT URINARY OBSTRUCTION: ICD-10-CM

## 2023-12-04 PROCEDURE — A9503 TC99M MEDRONATE: HCPCS

## 2023-12-11 ENCOUNTER — SUPERVISING PHYSICIAN REVIEW (OUTPATIENT)
Dept: MEDICAL GROUP | Facility: MEDICAL CENTER | Age: 83
End: 2023-12-11
Payer: MEDICARE

## 2023-12-12 ENCOUNTER — HOSPITAL ENCOUNTER (OUTPATIENT)
Facility: MEDICAL CENTER | Age: 83
End: 2023-12-12
Attending: UROLOGY | Admitting: UROLOGY
Payer: MEDICARE

## 2023-12-12 ENCOUNTER — APPOINTMENT (OUTPATIENT)
Dept: RADIOLOGY | Facility: MEDICAL CENTER | Age: 83
End: 2023-12-12
Attending: UROLOGY
Payer: MEDICARE

## 2023-12-12 VITALS
SYSTOLIC BLOOD PRESSURE: 147 MMHG | RESPIRATION RATE: 16 BRPM | TEMPERATURE: 97 F | DIASTOLIC BLOOD PRESSURE: 74 MMHG | WEIGHT: 144.84 LBS | HEIGHT: 70 IN | BODY MASS INDEX: 20.74 KG/M2 | OXYGEN SATURATION: 96 % | HEART RATE: 71 BPM

## 2023-12-12 DIAGNOSIS — N40.2 NODULAR PROSTATE WITHOUT LOWER URINARY TRACT SYMPTOMS: ICD-10-CM

## 2023-12-12 LAB
ERYTHROCYTE [DISTWIDTH] IN BLOOD BY AUTOMATED COUNT: 48.5 FL (ref 35.9–50)
HCT VFR BLD AUTO: 40.1 % (ref 42–52)
HGB BLD-MCNC: 13.4 G/DL (ref 14–18)
INR PPP: 1.03 (ref 0.87–1.13)
MCH RBC QN AUTO: 31.6 PG (ref 27–33)
MCHC RBC AUTO-ENTMCNC: 33.4 G/DL (ref 32.3–36.5)
MCV RBC AUTO: 94.6 FL (ref 81.4–97.8)
PATHOLOGY CONSULT NOTE: NORMAL
PLATELET # BLD AUTO: 249 K/UL (ref 164–446)
PMV BLD AUTO: 8.7 FL (ref 9–12.9)
PROTHROMBIN TIME: 13.6 SEC (ref 12–14.6)
RBC # BLD AUTO: 4.24 M/UL (ref 4.7–6.1)
WBC # BLD AUTO: 5.9 K/UL (ref 4.8–10.8)

## 2023-12-12 PROCEDURE — 700111 HCHG RX REV CODE 636 W/ 250 OVERRIDE (IP)

## 2023-12-12 PROCEDURE — 88342 IMHCHEM/IMCYTCHM 1ST ANTB: CPT

## 2023-12-12 PROCEDURE — 77012 CT SCAN FOR NEEDLE BIOPSY: CPT

## 2023-12-12 PROCEDURE — 88305 TISSUE EXAM BY PATHOLOGIST: CPT

## 2023-12-12 PROCEDURE — 88307 TISSUE EXAM BY PATHOLOGIST: CPT

## 2023-12-12 PROCEDURE — 85027 COMPLETE CBC AUTOMATED: CPT

## 2023-12-12 PROCEDURE — 99152 MOD SED SAME PHYS/QHP 5/>YRS: CPT

## 2023-12-12 PROCEDURE — 160035 HCHG PACU - 1ST 60 MINS PHASE I

## 2023-12-12 PROCEDURE — 160046 HCHG PACU - 1ST 60 MINS PHASE II

## 2023-12-12 PROCEDURE — 85610 PROTHROMBIN TIME: CPT

## 2023-12-12 PROCEDURE — 160002 HCHG RECOVERY MINUTES (STAT)

## 2023-12-12 PROCEDURE — 20225 BONE BIOPSY TROCAR/NDL DEEP: CPT

## 2023-12-12 RX ORDER — MIDAZOLAM HYDROCHLORIDE 1 MG/ML
.5-2 INJECTION INTRAMUSCULAR; INTRAVENOUS PRN
Status: DISCONTINUED | OUTPATIENT
Start: 2023-12-12 | End: 2023-12-12 | Stop reason: HOSPADM

## 2023-12-12 RX ORDER — MIDAZOLAM HYDROCHLORIDE 1 MG/ML
INJECTION INTRAMUSCULAR; INTRAVENOUS
Status: COMPLETED
Start: 2023-12-12 | End: 2023-12-12

## 2023-12-12 RX ORDER — ONDANSETRON 2 MG/ML
4 INJECTION INTRAMUSCULAR; INTRAVENOUS EVERY 8 HOURS PRN
Status: DISCONTINUED | OUTPATIENT
Start: 2023-12-12 | End: 2023-12-12 | Stop reason: HOSPADM

## 2023-12-12 RX ORDER — SODIUM CHLORIDE 9 MG/ML
500 INJECTION, SOLUTION INTRAVENOUS
Status: DISCONTINUED | OUTPATIENT
Start: 2023-12-12 | End: 2023-12-12 | Stop reason: HOSPADM

## 2023-12-12 RX ORDER — HYDROMORPHONE HYDROCHLORIDE 1 MG/ML
0.25 INJECTION, SOLUTION INTRAMUSCULAR; INTRAVENOUS; SUBCUTANEOUS
Status: DISCONTINUED | OUTPATIENT
Start: 2023-12-12 | End: 2023-12-12 | Stop reason: HOSPADM

## 2023-12-12 RX ORDER — ONDANSETRON 2 MG/ML
4 INJECTION INTRAMUSCULAR; INTRAVENOUS PRN
Status: DISCONTINUED | OUTPATIENT
Start: 2023-12-12 | End: 2023-12-12 | Stop reason: HOSPADM

## 2023-12-12 RX ORDER — OXYCODONE HYDROCHLORIDE 5 MG/1
2.5 TABLET ORAL
Status: DISCONTINUED | OUTPATIENT
Start: 2023-12-12 | End: 2023-12-12 | Stop reason: HOSPADM

## 2023-12-12 RX ADMIN — FENTANYL CITRATE 50 MCG: 50 INJECTION, SOLUTION INTRAMUSCULAR; INTRAVENOUS at 08:11

## 2023-12-12 RX ADMIN — MIDAZOLAM HYDROCHLORIDE 1 MG: 1 INJECTION, SOLUTION INTRAMUSCULAR; INTRAVENOUS at 08:11

## 2023-12-12 RX ADMIN — MIDAZOLAM HYDROCHLORIDE 1 MG: 1 INJECTION INTRAMUSCULAR; INTRAVENOUS at 08:11

## 2023-12-12 ASSESSMENT — PAIN DESCRIPTION - PAIN TYPE
TYPE: SURGICAL PAIN

## 2023-12-12 ASSESSMENT — FIBROSIS 4 INDEX: FIB4 SCORE: 1.79

## 2023-12-12 NOTE — PROGRESS NOTES
Supervising Physician Review    Reviewed note dated: 10/26/2023.     Recommendations: Agree with plan..

## 2023-12-12 NOTE — PROGRESS NOTES
Pt presents to CT-IR. Pt was consented by MD at bedside, confirmed by this RN and consent at bedside. Pt transferred to CT table in prone position. Patient underwent a sacral mass core biopsy by Dr. Hays. Procedure site was marked by MD and verified using imaging guidance. Pt placed on monitor, prepped and draped in a sterile fashion. Vitals were taken every 5 minutes and remained stable during procedure (see doc flow sheet for results). CO2 waveform capnography was monitored and remained WNL throughout procedure. Report called to Oscar CALZADA. Pt transported by stretcher with RN to [facility]PU.    Specimen: 6 cores in formalin hand delivered to lab.

## 2023-12-12 NOTE — OR SURGEON
Immediate Post- Operative Note        Findings: NEEDLE CONFIRMED IN SACRAL/PRE-SACRAL MASS C/W OSSEOUS MET      Procedure(s): CT DEEP BONE BX (SACRAL)    LEFT LATERAL APPROACH TO SACRAL MASS PROJECTING PRE-SACRAL    18G CORES X 6 IN FORMALIN.      Estimated Blood Loss: Less than 1 ml        Complications: None            12/12/2023     8:30 AM     Benja Hays M.D.

## 2023-12-12 NOTE — DISCHARGE INSTRUCTIONS
Needle Biopsy of the Bone  A needle biopsy of the bone is a procedure in which a small sample of bone is removed. The sample is taken with a needle. Then, the bone sample is looked at under a microscope to check for abnormalities. The sample is usually taken from a bone that is close to the skin. This procedure may be done to check for various problems with the bone. You may need this procedure if imaging tests or blood tests have indicated a possible problem. This procedure may be done to help determine if a bone tumor is cancerous (malignant). A bone biopsy can help to diagnose problems such as:  Tumors of the bone (sarcomas) and bone marrow (multiple myeloma).  Bone that forms abnormally (Paget's disease).  Noncancerous (benign) bone cysts.  Bony growths.  Infections in the bone.  Tell a health care provider about:  Any allergies you have.  All medicines you are taking, including vitamins, herbs, eye drops, creams, and over-the-counter medicines.  Any problems you or family members have had with anesthetic medicines.  Any blood disorders you have.  Any surgeries you have had.  Any medical conditions you have.  What are the risks?  Generally, this is a safe procedure. However, problems may occur, including:  Excessive bleeding.  Infection.  Injury to surrounding tissue.  What happens before the procedure?  Medicines  Ask your health care provider about:  Changing or stopping your regular medicines. This is especially important if you are taking diabetes medicines or blood thinners.  Taking medicines such as aspirin and ibuprofen. These medicines can thin your blood. Do not take these medicines unless your health care provider tells you to take them.  Taking over-the-counter medicines, vitamins, herbs, and supplements.  General instructions  Follow instructions from your health care provider about eating or drinking restrictions.  Plan to have someone take you home from the hospital or clinic.  If you will be going  home right after the procedure, plan to have someone with you for 24 hours.  What happens during the procedure?    An IV may be inserted into one of your veins.  The injection site will be cleaned with a germ-killing solution (antiseptic).  You may be given one or more of the following:  A medicine to help you relax (sedative).  A medicine to numb the area (local anesthetic).  The sample of bone will be removed by inserting a large needle through the skin and into the bone.  The needle will be removed.  Tissue from the needle will be sent to a laboratory to be analyzed.  A bandage (dressing) will be placed over the insertion site.  The procedure may vary among health care providers and hospitals.  What happens after the procedure?  Your blood pressure, heart rate, breathing rate, and blood oxygen level may be monitored until you leave the hospital or clinic.  Do not drive for 24 hours if you were given a sedative during your procedure.  You may have numbness and loss of feeling in the area for a few hours after the test.  It is up to you to get the results of your procedure. Ask your health care provider, or the department that is doing the procedure, when your results will be ready.  Summary  A needle biopsy of the bone is a procedure in which a small sample of bone is removed with a needle.  This procedure may be done to check for various problems with the bone.  Ask your health care provider about changing or stopping your regular medicines before the procedure.  Ask your health care provider, or the department that is doing the procedure, when your results will be ready.  This information is not intended to replace advice given to you by your health care provider. Make sure you discuss any questions you have with your health care provider.  Document Revised: 03/07/2022 Document Reviewed: 03/07/2022  Elsevier Patient Education © 2023 [employer]

## 2023-12-12 NOTE — OR NURSING
0837: Pt arrived from OR to bay #1. ID verified. Report received from IR-RN. Connected to monitor. On room air. In sinus rhythm, HR 60s. Left posterior hip biopsy site with gauze and tegaderm, noted clean dry and intact - site is soft with no hematoma. CMS intact. Pt denies pain or nausea.    0845: no status changes. Pt doing well. Voided 125mL via urinal. Biopsy site unchanged. Pt declines oral fluids at this time.    0910: updates given to shania Choi via surgical navigator text messaging. Pt declines visitors at this time and would like to wait until he is dressed.    0937: bedrest complete. Up to edge of bed with issues. Biopsy site remains unchanged. Pt able to ambulate from bay to bathroom with steady gait, no assistance required.    0941: family at bedside. Pt dressed himself. Biopsy site unchanged.    0945: discharge instructions given to patient and family. Both parties stated understanding and gave verbal feedback. No further questions.     0951: PIV removed with tip intact. Biopsy site remains unchanged after ambulation. Pt discharged home in stable condition. All belongings taken. Pt declines wheelchair ride out, ambulated off the unit with family.

## 2023-12-21 ENCOUNTER — HOSPITAL ENCOUNTER (OUTPATIENT)
Dept: LAB | Facility: MEDICAL CENTER | Age: 83
End: 2023-12-21
Attending: UROLOGY
Payer: MEDICARE

## 2023-12-21 LAB
ALBUMIN SERPL BCP-MCNC: 3.9 G/DL (ref 3.2–4.9)
ALBUMIN/GLOB SERPL: 1.4 G/DL
ALP SERPL-CCNC: 92 U/L (ref 30–99)
ALT SERPL-CCNC: 17 U/L (ref 2–50)
ANION GAP SERPL CALC-SCNC: 8 MMOL/L (ref 7–16)
AST SERPL-CCNC: 21 U/L (ref 12–45)
BILIRUB SERPL-MCNC: 0.4 MG/DL (ref 0.1–1.5)
BUN SERPL-MCNC: 29 MG/DL (ref 8–22)
CALCIUM ALBUM COR SERPL-MCNC: 8.7 MG/DL (ref 8.5–10.5)
CALCIUM SERPL-MCNC: 8.6 MG/DL (ref 8.4–10.2)
CHLORIDE SERPL-SCNC: 99 MMOL/L (ref 96–112)
CO2 SERPL-SCNC: 27 MMOL/L (ref 20–33)
CREAT SERPL-MCNC: 0.99 MG/DL (ref 0.5–1.4)
GFR SERPLBLD CREATININE-BSD FMLA CKD-EPI: 74 ML/MIN/1.73 M 2
GLOBULIN SER CALC-MCNC: 2.7 G/DL (ref 1.9–3.5)
GLUCOSE SERPL-MCNC: 129 MG/DL (ref 65–99)
POTASSIUM SERPL-SCNC: 4.6 MMOL/L (ref 3.6–5.5)
PROT SERPL-MCNC: 6.6 G/DL (ref 6–8.2)
PSA SERPL-MCNC: 20.1 NG/ML (ref 0–4)
SODIUM SERPL-SCNC: 134 MMOL/L (ref 135–145)
TESTOST SERPL-MCNC: <3 NG/DL (ref 175–781)

## 2023-12-21 PROCEDURE — 84403 ASSAY OF TOTAL TESTOSTERONE: CPT

## 2023-12-21 PROCEDURE — 84153 ASSAY OF PSA TOTAL: CPT

## 2023-12-21 PROCEDURE — 80053 COMPREHEN METABOLIC PANEL: CPT

## 2023-12-21 PROCEDURE — 36415 COLL VENOUS BLD VENIPUNCTURE: CPT

## 2024-02-05 ENCOUNTER — TELEPHONE (OUTPATIENT)
Dept: HEMATOLOGY ONCOLOGY | Facility: MEDICAL CENTER | Age: 84
End: 2024-02-05
Payer: MEDICARE

## 2024-02-06 ENCOUNTER — HOSPITAL ENCOUNTER (OUTPATIENT)
Dept: LAB | Facility: MEDICAL CENTER | Age: 84
End: 2024-02-06
Attending: UROLOGY
Payer: MEDICARE

## 2024-02-06 LAB
ALBUMIN SERPL BCP-MCNC: 4.1 G/DL (ref 3.2–4.9)
ALBUMIN/GLOB SERPL: 1.5 G/DL
ALP SERPL-CCNC: 88 U/L (ref 30–99)
ALT SERPL-CCNC: 13 U/L (ref 2–50)
ANION GAP SERPL CALC-SCNC: 9 MMOL/L (ref 7–16)
AST SERPL-CCNC: 20 U/L (ref 12–45)
BILIRUB SERPL-MCNC: 0.6 MG/DL (ref 0.1–1.5)
BUN SERPL-MCNC: 29 MG/DL (ref 8–22)
CALCIUM ALBUM COR SERPL-MCNC: 8.6 MG/DL (ref 8.5–10.5)
CALCIUM SERPL-MCNC: 8.7 MG/DL (ref 8.4–10.2)
CHLORIDE SERPL-SCNC: 101 MMOL/L (ref 96–112)
CO2 SERPL-SCNC: 29 MMOL/L (ref 20–33)
CREAT SERPL-MCNC: 0.91 MG/DL (ref 0.5–1.4)
FASTING STATUS PATIENT QL REPORTED: NORMAL
GFR SERPLBLD CREATININE-BSD FMLA CKD-EPI: 82 ML/MIN/1.73 M 2
GLOBULIN SER CALC-MCNC: 2.8 G/DL (ref 1.9–3.5)
GLUCOSE SERPL-MCNC: 88 MG/DL (ref 65–99)
POTASSIUM SERPL-SCNC: 4.2 MMOL/L (ref 3.6–5.5)
PROT SERPL-MCNC: 6.9 G/DL (ref 6–8.2)
PSA SERPL-MCNC: 0.94 NG/ML (ref 0–4)
SODIUM SERPL-SCNC: 139 MMOL/L (ref 135–145)
TESTOST SERPL-MCNC: <3 NG/DL (ref 175–781)

## 2024-02-06 PROCEDURE — 84153 ASSAY OF PSA TOTAL: CPT

## 2024-02-06 PROCEDURE — 36415 COLL VENOUS BLD VENIPUNCTURE: CPT

## 2024-02-06 PROCEDURE — 84403 ASSAY OF TOTAL TESTOSTERONE: CPT

## 2024-02-06 PROCEDURE — 80053 COMPREHEN METABOLIC PANEL: CPT

## 2024-02-06 NOTE — TELEPHONE ENCOUNTER
Called [facility] to see if patient had continued care with a provider. [facility] stated that they have no record of this patient or that he is being seen currently at their office.

## 2024-02-12 ENCOUNTER — TELEPHONE (OUTPATIENT)
Dept: HEMATOLOGY ONCOLOGY | Facility: MEDICAL CENTER | Age: 84
End: 2024-02-12
Payer: MEDICARE

## 2024-02-12 NOTE — TELEPHONE ENCOUNTER
Called to speak to patient to follow up that he was getting proper and current treatment since being seen with Heaven Aleman. Pt stated that he started his treatment January 23, 2024 under the care [facility], Dr. Sanjay London. Pt stated everything is going great and Is feeling well. Pt stated he is also still under the care of Dr. Caraballo with [facility]. I expressed to pt that if he needed anything to please reach out to the office. Pt was thankful and verbalized understanding.

## 2024-03-19 ENCOUNTER — HOSPITAL ENCOUNTER (OUTPATIENT)
Dept: LAB | Facility: MEDICAL CENTER | Age: 84
End: 2024-03-19
Attending: UROLOGY
Payer: MEDICARE

## 2024-03-19 LAB
ALBUMIN SERPL BCP-MCNC: 3.9 G/DL (ref 3.2–4.9)
ALBUMIN/GLOB SERPL: 1.3 G/DL
ALP SERPL-CCNC: 98 U/L (ref 30–99)
ALT SERPL-CCNC: 60 U/L (ref 2–50)
ANION GAP SERPL CALC-SCNC: 11 MMOL/L (ref 7–16)
AST SERPL-CCNC: 45 U/L (ref 12–45)
BILIRUB SERPL-MCNC: 0.5 MG/DL (ref 0.1–1.5)
BUN SERPL-MCNC: 37 MG/DL (ref 8–22)
CALCIUM ALBUM COR SERPL-MCNC: 8.8 MG/DL (ref 8.5–10.5)
CALCIUM SERPL-MCNC: 8.7 MG/DL (ref 8.4–10.2)
CHLORIDE SERPL-SCNC: 103 MMOL/L (ref 96–112)
CO2 SERPL-SCNC: 26 MMOL/L (ref 20–33)
CREAT SERPL-MCNC: 0.92 MG/DL (ref 0.5–1.4)
FASTING STATUS PATIENT QL REPORTED: NORMAL
GFR SERPLBLD CREATININE-BSD FMLA CKD-EPI: 81 ML/MIN/1.73 M 2
GLOBULIN SER CALC-MCNC: 3 G/DL (ref 1.9–3.5)
GLUCOSE SERPL-MCNC: 88 MG/DL (ref 65–99)
POTASSIUM SERPL-SCNC: 4.1 MMOL/L (ref 3.6–5.5)
PROT SERPL-MCNC: 6.9 G/DL (ref 6–8.2)
SODIUM SERPL-SCNC: 140 MMOL/L (ref 135–145)

## 2024-03-19 PROCEDURE — 36415 COLL VENOUS BLD VENIPUNCTURE: CPT

## 2024-03-19 PROCEDURE — 80053 COMPREHEN METABOLIC PANEL: CPT

## 2024-05-07 ENCOUNTER — HOSPITAL ENCOUNTER (OUTPATIENT)
Dept: LAB | Facility: MEDICAL CENTER | Age: 84
End: 2024-05-07
Attending: UROLOGY
Payer: MEDICARE

## 2024-05-07 LAB
ALBUMIN SERPL BCP-MCNC: 4 G/DL (ref 3.2–4.9)
ALBUMIN/GLOB SERPL: 1.4 G/DL
ALP SERPL-CCNC: 74 U/L (ref 30–99)
ALT SERPL-CCNC: 17 U/L (ref 2–50)
ANION GAP SERPL CALC-SCNC: 9 MMOL/L (ref 7–16)
AST SERPL-CCNC: 20 U/L (ref 12–45)
BILIRUB SERPL-MCNC: 0.3 MG/DL (ref 0.1–1.5)
BUN SERPL-MCNC: 41 MG/DL (ref 8–22)
CALCIUM ALBUM COR SERPL-MCNC: 8.9 MG/DL (ref 8.5–10.5)
CALCIUM SERPL-MCNC: 8.9 MG/DL (ref 8.4–10.2)
CHLORIDE SERPL-SCNC: 101 MMOL/L (ref 96–112)
CO2 SERPL-SCNC: 27 MMOL/L (ref 20–33)
CREAT SERPL-MCNC: 0.95 MG/DL (ref 0.5–1.4)
GFR SERPLBLD CREATININE-BSD FMLA CKD-EPI: 78 ML/MIN/1.73 M 2
GLOBULIN SER CALC-MCNC: 2.9 G/DL (ref 1.9–3.5)
GLUCOSE SERPL-MCNC: 113 MG/DL (ref 65–99)
POTASSIUM SERPL-SCNC: 4.6 MMOL/L (ref 3.6–5.5)
PROT SERPL-MCNC: 6.9 G/DL (ref 6–8.2)
PSA SERPL-MCNC: 0.04 NG/ML (ref 0–4)
SODIUM SERPL-SCNC: 137 MMOL/L (ref 135–145)
TESTOST SERPL-MCNC: <3 NG/DL (ref 175–781)

## 2024-05-31 ENCOUNTER — TELEPHONE (OUTPATIENT)
Dept: HEALTH INFORMATION MANAGEMENT | Facility: OTHER | Age: 84
End: 2024-05-31

## 2024-06-27 ENCOUNTER — HOSPITAL ENCOUNTER (OUTPATIENT)
Dept: LAB | Facility: MEDICAL CENTER | Age: 84
End: 2024-06-27
Attending: UROLOGY
Payer: MEDICARE

## 2024-06-27 LAB
ALBUMIN SERPL BCP-MCNC: 3.7 G/DL (ref 3.2–4.9)
ALBUMIN/GLOB SERPL: 1.2 G/DL
ALP SERPL-CCNC: 76 U/L (ref 30–99)
ALT SERPL-CCNC: 14 U/L (ref 2–50)
ANION GAP SERPL CALC-SCNC: 11 MMOL/L (ref 7–16)
AST SERPL-CCNC: 22 U/L (ref 12–45)
BILIRUB SERPL-MCNC: 0.6 MG/DL (ref 0.1–1.5)
BUN SERPL-MCNC: 33 MG/DL (ref 8–22)
CALCIUM ALBUM COR SERPL-MCNC: 9 MG/DL (ref 8.5–10.5)
CALCIUM SERPL-MCNC: 8.8 MG/DL (ref 8.4–10.2)
CHLORIDE SERPL-SCNC: 105 MMOL/L (ref 96–112)
CO2 SERPL-SCNC: 23 MMOL/L (ref 20–33)
CREAT SERPL-MCNC: 1.03 MG/DL (ref 0.5–1.4)
FASTING STATUS PATIENT QL REPORTED: NORMAL
GFR SERPLBLD CREATININE-BSD FMLA CKD-EPI: 71 ML/MIN/1.73 M 2
GLOBULIN SER CALC-MCNC: 3.1 G/DL (ref 1.9–3.5)
GLUCOSE SERPL-MCNC: 99 MG/DL (ref 65–99)
POTASSIUM SERPL-SCNC: 4.3 MMOL/L (ref 3.6–5.5)
PROT SERPL-MCNC: 6.8 G/DL (ref 6–8.2)
PSA SERPL-MCNC: 0.03 NG/ML (ref 0–4)
SODIUM SERPL-SCNC: 139 MMOL/L (ref 135–145)
TESTOST SERPL-MCNC: <3 NG/DL (ref 175–781)

## 2024-06-27 PROCEDURE — 84403 ASSAY OF TOTAL TESTOSTERONE: CPT

## 2024-06-27 PROCEDURE — 80053 COMPREHEN METABOLIC PANEL: CPT

## 2024-06-27 PROCEDURE — 84153 ASSAY OF PSA TOTAL: CPT

## 2024-06-27 PROCEDURE — 36415 COLL VENOUS BLD VENIPUNCTURE: CPT

## 2024-06-28 DIAGNOSIS — N40.0 ENLARGED PROSTATE: ICD-10-CM

## 2024-06-28 RX ORDER — TAMSULOSIN HYDROCHLORIDE 0.4 MG/1
CAPSULE ORAL
Qty: 100 CAPSULE | Refills: 0 | Status: SHIPPED | OUTPATIENT
Start: 2024-06-28

## 2024-06-28 NOTE — TELEPHONE ENCOUNTER
Received request via: Pharmacy    Was the patient seen in the last year in this department? Yes    Does the patient have an active prescription (recently filled or refills available) for medication(s) requested? No    Pharmacy Name: [facility]     Does the patient have custodial Plus and need 100 day supply (blood pressure, diabetes and cholesterol meds only)? Patient does not have SCP

## 2024-08-14 ENCOUNTER — HOSPITAL ENCOUNTER (OUTPATIENT)
Dept: LAB | Facility: MEDICAL CENTER | Age: 84
End: 2024-08-14
Attending: UROLOGY
Payer: MEDICARE

## 2024-08-14 LAB
ALBUMIN SERPL BCP-MCNC: 3.7 G/DL (ref 3.2–4.9)
ALBUMIN/GLOB SERPL: 1.3 G/DL
ALP SERPL-CCNC: 73 U/L (ref 30–99)
ALT SERPL-CCNC: 14 U/L (ref 2–50)
ANION GAP SERPL CALC-SCNC: 9 MMOL/L (ref 7–16)
AST SERPL-CCNC: 17 U/L (ref 12–45)
BILIRUB SERPL-MCNC: 0.5 MG/DL (ref 0.1–1.5)
BUN SERPL-MCNC: 40 MG/DL (ref 8–22)
CALCIUM ALBUM COR SERPL-MCNC: 8.9 MG/DL (ref 8.5–10.5)
CALCIUM SERPL-MCNC: 8.7 MG/DL (ref 8.4–10.2)
CHLORIDE SERPL-SCNC: 105 MMOL/L (ref 96–112)
CO2 SERPL-SCNC: 28 MMOL/L (ref 20–33)
CREAT SERPL-MCNC: 0.88 MG/DL (ref 0.5–1.4)
FASTING STATUS PATIENT QL REPORTED: NORMAL
GFR SERPLBLD CREATININE-BSD FMLA CKD-EPI: 83 ML/MIN/1.73 M 2
GLOBULIN SER CALC-MCNC: 2.8 G/DL (ref 1.9–3.5)
GLUCOSE SERPL-MCNC: 130 MG/DL (ref 65–99)
POTASSIUM SERPL-SCNC: 4.7 MMOL/L (ref 3.6–5.5)
PROT SERPL-MCNC: 6.5 G/DL (ref 6–8.2)
PSA SERPL-MCNC: 0.02 NG/ML (ref 0–4)
SODIUM SERPL-SCNC: 142 MMOL/L (ref 135–145)
TESTOST SERPL-MCNC: <3 NG/DL (ref 175–781)

## 2024-08-14 PROCEDURE — 84153 ASSAY OF PSA TOTAL: CPT

## 2024-08-14 PROCEDURE — 80053 COMPREHEN METABOLIC PANEL: CPT

## 2024-08-14 PROCEDURE — 84403 ASSAY OF TOTAL TESTOSTERONE: CPT

## 2024-08-14 PROCEDURE — 36415 COLL VENOUS BLD VENIPUNCTURE: CPT

## 2024-09-10 ENCOUNTER — OFFICE VISIT (OUTPATIENT)
Dept: MEDICAL GROUP | Facility: MEDICAL CENTER | Age: 84
End: 2024-09-10
Payer: MEDICARE

## 2024-09-10 DIAGNOSIS — E11.65 TYPE 2 DIABETES MELLITUS WITH HYPERGLYCEMIA, WITHOUT LONG-TERM CURRENT USE OF INSULIN (HCC): ICD-10-CM

## 2024-09-10 DIAGNOSIS — H40.9 GLAUCOMA, UNSPECIFIED GLAUCOMA TYPE, UNSPECIFIED LATERALITY: ICD-10-CM

## 2024-09-10 DIAGNOSIS — R73.09 ELEVATED GLUCOSE: ICD-10-CM

## 2024-09-10 DIAGNOSIS — E78.00 PURE HYPERCHOLESTEROLEMIA: ICD-10-CM

## 2024-09-10 DIAGNOSIS — H61.23 BILATERAL IMPACTED CERUMEN: ICD-10-CM

## 2024-09-10 DIAGNOSIS — C61 PROSTATE CANCER (HCC): ICD-10-CM

## 2024-09-10 DIAGNOSIS — N40.0 ENLARGED PROSTATE: ICD-10-CM

## 2024-09-10 LAB
HBA1C MFR BLD: 6.5 % (ref ?–5.8)
POCT INT CON NEG: NEGATIVE
POCT INT CON POS: POSITIVE

## 2024-09-10 PROCEDURE — 99214 OFFICE O/P EST MOD 30 MIN: CPT | Performed by: PHYSICIAN ASSISTANT

## 2024-09-10 PROCEDURE — 83036 HEMOGLOBIN GLYCOSYLATED A1C: CPT | Performed by: PHYSICIAN ASSISTANT

## 2024-09-10 RX ORDER — ABIRATERONE ACETATE 250 MG/1
250 TABLET ORAL 4 TIMES DAILY
COMMUNITY
Start: 2024-06-16

## 2024-09-10 RX ORDER — PREDNISONE 5 MG/1
5 TABLET ORAL 2 TIMES DAILY
COMMUNITY
Start: 2024-09-04

## 2024-09-10 ASSESSMENT — PATIENT HEALTH QUESTIONNAIRE - PHQ9: CLINICAL INTERPRETATION OF PHQ2 SCORE: 0

## 2024-09-10 NOTE — PROGRESS NOTES
Subjective:     History of Present Illness  The patient is an 84-year-old male who presents for evaluation of multiple medical concerns.    He is currently on a regimen of Flomax for his enlarged prostate, timolol for glaucoma, and abiraterone acetate 250 mg four times daily for prostate cancer under the care of Dr. Anibal Caraballo. Additionally, he receives a quarterly injection to inhibit testosterone production.    He has been on prednisone for approximately a year and reports experiencing hot flashes as a side effect. His recent lab results were within normal limits, except for slightly elevated glucose levels. He has no history of prediabetes and does not require any medication refills at this time.    He reports no pain or depression.    He has previously sought treatment for ear wax removal from Dr. Toure.    IMMUNIZATIONS  He got influenza and COVID-19 vaccines yesterday.      Current medicines (including changes today)  Current Outpatient Medications   Medication Sig Dispense Refill    Abiraterone Acetate 250 MG Tab Take 250 mg by mouth 4 times a day.      predniSONE (DELTASONE) 5 MG Tab Take 5 mg by mouth 2 times a day.      tamsulosin (FLOMAX) 0.4 MG capsule TAKE 1 CAPSULE BY MOUTH ONCE DAILY 30  MINUTES  AFTER  BREAKFAST 100 Capsule 0    timolol (TIMOPTIC) 0.5 % Solution INSTILL 1 DROP INTO EACH EYE IN THE MORNING       No current facility-administered medications for this visit.     He  has a past medical history of DVT (deep venous thrombosis) ([facility]) (1995) and Patient denies medical problems.    ROS   No chest pain, no shortness of breath, no abdominal pain  Positive ROS as per HPI.  All other systems reviewed and are negative.     Objective:     There were no vitals taken for this visit. There is no height or weight on file to calculate BMI.   Physical Exam  Cerumen impaction noted bilaterally in ears.  Heart exhibits a 2 out of 6 holosystolic ejection murmur.  Constitutional: Alert, no distress.  Skin:  Warm, dry, good turgor, no rashes in visible areas.  Eye: Equal, round and reactive, conjunctiva clear, lids normal.  ENMT: Lips without lesions, good dentition, oropharynx clear.  Neck: Trachea midline, no masses, no thyromegaly. No cervical or supraclavicular lymphadenopathy  Respiratory: Unlabored respiratory effort, lungs clear to auscultation, no wheezes, no ronchi.  Cardiovascular: Normal S1, S2, no murmur, no edema.  Abdomen: Soft, non-tender, no masses, no hepatosplenomegaly.  Psych: Alert and oriented x3, normal affect and mood.      Results  Laboratory Studies  Hemoglobin A1c was 6.5%. Glucose levels were high.     Latest Reference Range & Units 08/14/24 06:42   Sodium 135 - 145 mmol/L 142   Potassium 3.6 - 5.5 mmol/L 4.7   Chloride 96 - 112 mmol/L 105   Co2 20 - 33 mmol/L 28   Anion Gap 7.0 - 16.0  9.0   Glucose 65 - 99 mg/dL 130 (H)   Bun 8 - 22 mg/dL 40 (H)   Creatinine 0.50 - 1.40 mg/dL 0.88   GFR (CKD-EPI) >60 mL/min/1.73 m 2 83   Calcium 8.4 - 10.2 mg/dL 8.7   Correct Calcium 8.5 - 10.5 mg/dL 8.9   AST(SGOT) 12 - 45 U/L 17   ALT(SGPT) 2 - 50 U/L 14   Alkaline Phosphatase 30 - 99 U/L 73   Total Bilirubin 0.1 - 1.5 mg/dL 0.5   Albumin 3.2 - 4.9 g/dL 3.7   Total Protein 6.0 - 8.2 g/dL 6.5   Globulin 1.9 - 3.5 g/dL 2.8   A-G Ratio g/dL 1.3   Fasting Status  Fasting   Prostatic Specific Antigen Tot 0.00 - 4.00 ng/mL 0.02   Testosterone,Total 175 - 781 ng/dL <3 (L)   (H): Data is abnormally high  (L): Data is abnormally low    Prostatic Specific Antigen Tot  0.00 - 4.00 ng/mL 0.04     Assessment and Plan:   The following treatment plan was discussed    Assessment & Plan  1. Type 2 Diabetes.  His hemoglobin A1c is 6.5%, indicating type 2 diabetes. This is likely due to his prolonged use of prednisone over the past year for prostate cancer management. A recheck of his hemoglobin A1c will be conducted in 3 months. He is advised to reduce his sugar intake and consider a decrease in his prednisone dosage from  10 mg daily (5 mg orally twice daily) to 5 mg once daily. He should discuss this adjustment with Dr. Anibal Caraballo.    2. Bilateral Cerumen Impaction.  He is recommended to use Debrox, an over-the-counter medication, once or twice a week to prevent hardening of the wax. A referral to [facility] will be made for further management.    3. Prostate Cancer.  He is currently on abiraterone acetate 250 mg, taking four tablets daily, and receives a quarterly testosterone blocker injection. He is also on prednisone 5 mg tablets, taken twice daily. Continued monitoring and coordination with Dr. Anibal Caraballo for ongoing management are necessary.    4. Glaucoma.  He is on timolol for glaucoma management. No changes to his current regimen are required at this time.    5. Enlarged Prostate.  He is on Flomax for the management of his enlarged prostate. No changes to his current regimen are required at this time.    Follow-up  A follow-up visit is scheduled in 3 months.      ORDERS:  1. Enlarged prostate      2. Glaucoma, unspecified glaucoma type, unspecified laterality      3. Prostate cancer (HCC)            Follow Up: 12 weeks    Please note that this dictation was created using voice recognition software. I have made every reasonable attempt to correct obvious errors, but I expect that there are errors of grammar and possibly content that I did not discover before finalizing the note.      Attestation      Verbal consent was acquired by the patient to use [employer] ambient listening note generation during this visit Yes

## 2024-10-03 DIAGNOSIS — N40.0 ENLARGED PROSTATE: ICD-10-CM

## 2024-10-04 RX ORDER — TAMSULOSIN HYDROCHLORIDE 0.4 MG/1
CAPSULE ORAL
Qty: 100 CAPSULE | Refills: 0 | Status: SHIPPED | OUTPATIENT
Start: 2024-10-04

## 2024-11-19 ENCOUNTER — HOSPITAL ENCOUNTER (OUTPATIENT)
Dept: LAB | Facility: MEDICAL CENTER | Age: 84
End: 2024-11-19
Attending: UROLOGY
Payer: MEDICARE

## 2024-11-19 LAB
ALBUMIN SERPL BCP-MCNC: 3.7 G/DL (ref 3.2–4.9)
ALBUMIN/GLOB SERPL: 1.2 G/DL
ALP SERPL-CCNC: 69 U/L (ref 30–99)
ALT SERPL-CCNC: 12 U/L (ref 2–50)
ANION GAP SERPL CALC-SCNC: 10 MMOL/L (ref 7–16)
AST SERPL-CCNC: 19 U/L (ref 12–45)
BILIRUB SERPL-MCNC: 0.6 MG/DL (ref 0.1–1.5)
BUN SERPL-MCNC: 43 MG/DL (ref 8–22)
CALCIUM ALBUM COR SERPL-MCNC: 9.5 MG/DL (ref 8.5–10.5)
CALCIUM SERPL-MCNC: 9.3 MG/DL (ref 8.4–10.2)
CHLORIDE SERPL-SCNC: 102 MMOL/L (ref 96–112)
CO2 SERPL-SCNC: 27 MMOL/L (ref 20–33)
CREAT SERPL-MCNC: 0.83 MG/DL (ref 0.5–1.4)
GFR SERPLBLD CREATININE-BSD FMLA CKD-EPI: 85 ML/MIN/1.73 M 2
GLOBULIN SER CALC-MCNC: 3 G/DL (ref 1.9–3.5)
GLUCOSE SERPL-MCNC: 128 MG/DL (ref 65–99)
POTASSIUM SERPL-SCNC: 4 MMOL/L (ref 3.6–5.5)
PROT SERPL-MCNC: 6.7 G/DL (ref 6–8.2)
PSA SERPL-MCNC: 0.02 NG/ML (ref 0–4)
SODIUM SERPL-SCNC: 139 MMOL/L (ref 135–145)
TESTOST SERPL-MCNC: <3 NG/DL (ref 175–781)

## 2024-11-19 PROCEDURE — 80053 COMPREHEN METABOLIC PANEL: CPT

## 2024-11-19 PROCEDURE — 84153 ASSAY OF PSA TOTAL: CPT

## 2024-11-19 PROCEDURE — 36415 COLL VENOUS BLD VENIPUNCTURE: CPT

## 2024-11-19 PROCEDURE — 84403 ASSAY OF TOTAL TESTOSTERONE: CPT

## 2024-12-11 ENCOUNTER — HOSPITAL ENCOUNTER (OUTPATIENT)
Dept: LAB | Facility: MEDICAL CENTER | Age: 84
End: 2024-12-11
Attending: UROLOGY
Payer: MEDICARE

## 2024-12-11 LAB
ALBUMIN SERPL BCP-MCNC: 3.7 G/DL (ref 3.2–4.9)
ALBUMIN/GLOB SERPL: 1.3 G/DL
ALP SERPL-CCNC: 78 U/L (ref 30–99)
ALT SERPL-CCNC: 11 U/L (ref 2–50)
ANION GAP SERPL CALC-SCNC: 7 MMOL/L (ref 7–16)
AST SERPL-CCNC: 20 U/L (ref 12–45)
BILIRUB SERPL-MCNC: 0.5 MG/DL (ref 0.1–1.5)
BUN SERPL-MCNC: 36 MG/DL (ref 8–22)
CALCIUM ALBUM COR SERPL-MCNC: 9.1 MG/DL (ref 8.5–10.5)
CALCIUM SERPL-MCNC: 8.9 MG/DL (ref 8.4–10.2)
CHLORIDE SERPL-SCNC: 101 MMOL/L (ref 96–112)
CO2 SERPL-SCNC: 29 MMOL/L (ref 20–33)
CREAT SERPL-MCNC: 0.85 MG/DL (ref 0.5–1.4)
FASTING STATUS PATIENT QL REPORTED: NORMAL
GFR SERPLBLD CREATININE-BSD FMLA CKD-EPI: 84 ML/MIN/1.73 M 2
GLOBULIN SER CALC-MCNC: 2.9 G/DL (ref 1.9–3.5)
GLUCOSE SERPL-MCNC: 93 MG/DL (ref 65–99)
POTASSIUM SERPL-SCNC: 4.5 MMOL/L (ref 3.6–5.5)
PROT SERPL-MCNC: 6.6 G/DL (ref 6–8.2)
PSA SERPL-MCNC: 0.02 NG/ML (ref 0–4)
SODIUM SERPL-SCNC: 137 MMOL/L (ref 135–145)
TESTOST SERPL-MCNC: <3 NG/DL (ref 175–781)

## 2024-12-11 PROCEDURE — 36415 COLL VENOUS BLD VENIPUNCTURE: CPT

## 2024-12-11 PROCEDURE — 84153 ASSAY OF PSA TOTAL: CPT

## 2024-12-11 PROCEDURE — 80053 COMPREHEN METABOLIC PANEL: CPT

## 2024-12-11 PROCEDURE — 84403 ASSAY OF TOTAL TESTOSTERONE: CPT

## 2024-12-12 ENCOUNTER — OFFICE VISIT (OUTPATIENT)
Dept: MEDICAL GROUP | Facility: MEDICAL CENTER | Age: 84
End: 2024-12-12
Payer: MEDICARE

## 2024-12-12 VITALS
HEART RATE: 59 BPM | HEIGHT: 70 IN | SYSTOLIC BLOOD PRESSURE: 100 MMHG | BODY MASS INDEX: 23.62 KG/M2 | DIASTOLIC BLOOD PRESSURE: 78 MMHG | TEMPERATURE: 97 F | WEIGHT: 165 LBS | OXYGEN SATURATION: 96 %

## 2024-12-12 DIAGNOSIS — E11.65 TYPE 2 DIABETES MELLITUS WITH HYPERGLYCEMIA, WITHOUT LONG-TERM CURRENT USE OF INSULIN (HCC): ICD-10-CM

## 2024-12-12 DIAGNOSIS — N40.0 ENLARGED PROSTATE: ICD-10-CM

## 2024-12-12 LAB
HBA1C MFR BLD: 6 % (ref ?–5.8)
POCT INT CON NEG: NEGATIVE
POCT INT CON POS: POSITIVE

## 2024-12-12 PROCEDURE — 99214 OFFICE O/P EST MOD 30 MIN: CPT | Performed by: PHYSICIAN ASSISTANT

## 2024-12-12 PROCEDURE — 3078F DIAST BP <80 MM HG: CPT | Performed by: PHYSICIAN ASSISTANT

## 2024-12-12 PROCEDURE — 83036 HEMOGLOBIN GLYCOSYLATED A1C: CPT | Performed by: PHYSICIAN ASSISTANT

## 2024-12-12 PROCEDURE — 3074F SYST BP LT 130 MM HG: CPT | Performed by: PHYSICIAN ASSISTANT

## 2024-12-12 RX ORDER — LATANOPROST 50 UG/ML
SOLUTION/ DROPS OPHTHALMIC
COMMUNITY
Start: 2024-10-26

## 2024-12-12 RX ORDER — TAMSULOSIN HYDROCHLORIDE 0.4 MG/1
0.4 CAPSULE ORAL DAILY
Qty: 100 CAPSULE | Refills: 3 | Status: SHIPPED | OUTPATIENT
Start: 2024-12-12 | End: 2025-03-22

## 2024-12-12 ASSESSMENT — FIBROSIS 4 INDEX: FIB4 SCORE: 2.08

## 2024-12-12 NOTE — PROGRESS NOTES
Subjective:     History of Present Illness  The patient presents for evaluation of diabetes and prostate cancer.    He has been experiencing persistent fatigue, which he attributes to his testosterone blocker medication. Despite this, he maintains an active lifestyle, including regular gym workouts. He is currently on a regimen of prednisone 10 mg daily for his prostate cancer, a condition that has resulted in a prolonged low PSA level. His physician, Dr. Caraballo, has expressed satisfaction with this outcome. He continues to take Flomax and does not require any medication refills at this time.    He was previously informed that his prednisone treatment could potentially induce a diabetic state. However, he was advised not to be overly concerned about the numerical values and was encouraged to manage his condition through dietary modifications.    Supplemental Information  He has a past history of a broken leg due to a fall from a roof.    SOCIAL HISTORY  He is from [place].    MEDICATIONS  Current: Prednisone, tamsulosin    IMMUNIZATIONS  He has had the influenza vaccine, COVID-19 vaccine, and pneumonia vaccine.      Current medicines (including changes today)  Current Outpatient Medications   Medication Sig Dispense Refill    latanoprost (XALATAN) 0.005 % Solution INSTILL 1 DROP INTO EACH EYE ONCE DAILY AT NIGHT      tamsulosin (FLOMAX) 0.4 MG capsule Take 1 Capsule by mouth every day for 100 days. 100 Capsule 3    Abiraterone Acetate 250 MG Tab Take 250 mg by mouth 4 times a day.      predniSONE (DELTASONE) 5 MG Tab Take 5 mg by mouth 2 times a day.      timolol (TIMOPTIC) 0.5 % Solution INSTILL 1 DROP INTO EACH EYE IN THE MORNING       No current facility-administered medications for this visit.     He  has a past medical history of DVT (deep venous thrombosis) ([facility]) (1995) and Patient denies medical problems.    ROS   No chest pain, no shortness of breath, no abdominal pain  Positive ROS as per HPI.  All other  "systems reviewed and are negative.     Objective:     /78   Pulse (!) 59   Temp 36.1 °C (97 °F) (Temporal)   Ht 1.778 m (5' 10\")   Wt 74.8 kg (165 lb)   SpO2 96%  Body mass index is 23.68 kg/m².   Physical Exam    Constitutional: Alert, no distress.  Skin: Warm, dry, good turgor, no rashes in visible areas.  Eye: Equal, round and reactive, conjunctiva clear, lids normal.  ENMT: Lips without lesions, good dentition, oropharynx clear.  Neck: Trachea midline, no masses, no thyromegaly. No cervical or supraclavicular lymphadenopathy  Respiratory: Unlabored respiratory effort, lungs clear to auscultation, no wheezes, no ronchi.  Cardiovascular: Normal S1, S2, no murmur, no edema.  Abdomen: Soft, non-tender, no masses, no hepatosplenomegaly.  Psych: Alert and oriented x3, normal affect and mood.      Results  Laboratory Studies  PSA was low. Hemoglobin A1c was 6.5% three months ago, but now is 6.0%        Assessment and Plan:   The following treatment plan was discussed    Assessment & Plan  1. Diabetes.  His blood glucose levels have shown a significant decrease to 93, which is a positive development. His hbg went from 6.5% to 6.0% with diet and lifestyle. However, his prednisone medication is contributing to a diabetic state. He is advised to manage his blood sugar levels through diet and exercise. A hemoglobin A1c test will be conducted today to monitor his average blood glucose levels over the past 90 days. If his blood sugar levels continue to rise, further interventions may be necessary. Due to hx of prostate Cancer he is on prednisone 5mg twice daily. By dr Caraballo. He will continue to watch his sugar intake noting that the prednisone can contribute to elevated sugars.    2. Prostate cancer.  His PSA levels are low, indicating effective management of his prostate cancer. He is currently on a testosterone blocker, which sometimes makes him tired. He is also taking prednisone 10 mg daily. He is advised to " continue his current medication regimen, including Flomax (tamsulosin)., abiraterone acetage 250mg daily      4. Glaucoma.  He is on timolol for glaucoma management. No changes to his current regimen are required at this time.     5. Enlarged Prostate.  He is on Flomax for the management of his enlarged prostate. No changes to his current regimen are required at this time.     Follow-up  A follow-up visit is scheduled in 3 months.       Rene was seen today for follow-up.    Diagnoses and all orders for this visit:    Type 2 diabetes mellitus with hyperglycemia, without long-term current use of insulin ([facility])  -     POCT Hemoglobin A1C    Enlarged prostate  -     tamsulosin (FLOMAX) 0.4 MG capsule; Take 1 Capsule by mouth every day for 100 days.          Anticipatory guidance discussed at length including regular daily exercise with a goal of 150 minutes a week.  Recommendation to eat the Mediterranean diet to decrease cardiovascular risk.  Encouraged avoiding high fructose corn syrup and other simple sugars to reduce risk of obesity and type 2 diabetes.    Follow Up: 3 months    Please note that this dictation was created using voice recognition software. I have made every reasonable attempt to correct obvious errors, but I expect that there are errors of grammar and possibly content that I did not discover before finalizing the note.      Attestation      Verbal consent was acquired by the patient to use [employer] ambient listening note generation during this visit Yes

## 2025-01-03 ENCOUNTER — HOSPITAL ENCOUNTER (OUTPATIENT)
Dept: LAB | Facility: MEDICAL CENTER | Age: 85
End: 2025-01-03
Attending: UROLOGY
Payer: MEDICARE

## 2025-01-03 LAB
ALBUMIN SERPL BCP-MCNC: 3.9 G/DL (ref 3.2–4.9)
ALBUMIN/GLOB SERPL: 1.3 G/DL
ALP SERPL-CCNC: 80 U/L (ref 30–99)
ALT SERPL-CCNC: 14 U/L (ref 2–50)
ANION GAP SERPL CALC-SCNC: 9 MMOL/L (ref 7–16)
AST SERPL-CCNC: 18 U/L (ref 12–45)
BILIRUB SERPL-MCNC: 0.5 MG/DL (ref 0.1–1.5)
BUN SERPL-MCNC: 43 MG/DL (ref 8–22)
CALCIUM ALBUM COR SERPL-MCNC: 9.2 MG/DL (ref 8.5–10.5)
CALCIUM SERPL-MCNC: 9.1 MG/DL (ref 8.4–10.2)
CHLORIDE SERPL-SCNC: 101 MMOL/L (ref 96–112)
CO2 SERPL-SCNC: 29 MMOL/L (ref 20–33)
CREAT SERPL-MCNC: 0.88 MG/DL (ref 0.5–1.4)
FASTING STATUS PATIENT QL REPORTED: NORMAL
GFR SERPLBLD CREATININE-BSD FMLA CKD-EPI: 83 ML/MIN/1.73 M 2
GLOBULIN SER CALC-MCNC: 3 G/DL (ref 1.9–3.5)
GLUCOSE SERPL-MCNC: 122 MG/DL (ref 65–99)
POTASSIUM SERPL-SCNC: 4.6 MMOL/L (ref 3.6–5.5)
PROT SERPL-MCNC: 6.9 G/DL (ref 6–8.2)
PSA SERPL-MCNC: <0.02 NG/ML (ref 0–4)
SODIUM SERPL-SCNC: 139 MMOL/L (ref 135–145)
TESTOST SERPL-MCNC: <3 NG/DL (ref 175–781)

## 2025-01-03 PROCEDURE — 84403 ASSAY OF TOTAL TESTOSTERONE: CPT

## 2025-01-03 PROCEDURE — 80053 COMPREHEN METABOLIC PANEL: CPT

## 2025-01-03 PROCEDURE — 84153 ASSAY OF PSA TOTAL: CPT

## 2025-01-03 PROCEDURE — 36415 COLL VENOUS BLD VENIPUNCTURE: CPT

## 2025-02-24 ENCOUNTER — HOSPITAL ENCOUNTER (OUTPATIENT)
Facility: MEDICAL CENTER | Age: 85
End: 2025-02-24
Attending: UROLOGY
Payer: MEDICARE

## 2025-02-24 LAB
ALBUMIN SERPL BCP-MCNC: 3.7 G/DL (ref 3.2–4.9)
ALBUMIN/GLOB SERPL: 1.4 G/DL
ALP SERPL-CCNC: 72 U/L (ref 30–99)
ALT SERPL-CCNC: 12 U/L (ref 2–50)
ANION GAP SERPL CALC-SCNC: 8 MMOL/L (ref 7–16)
AST SERPL-CCNC: 22 U/L (ref 12–45)
BILIRUB SERPL-MCNC: 0.6 MG/DL (ref 0.1–1.5)
BUN SERPL-MCNC: 39 MG/DL (ref 8–22)
CALCIUM ALBUM COR SERPL-MCNC: 9.4 MG/DL (ref 8.5–10.5)
CALCIUM SERPL-MCNC: 9.2 MG/DL (ref 8.4–10.2)
CHLORIDE SERPL-SCNC: 102 MMOL/L (ref 96–112)
CO2 SERPL-SCNC: 26 MMOL/L (ref 20–33)
CREAT SERPL-MCNC: 0.99 MG/DL (ref 0.5–1.4)
FASTING STATUS PATIENT QL REPORTED: NORMAL
GFR SERPLBLD CREATININE-BSD FMLA CKD-EPI: 74 ML/MIN/1.73 M 2
GLOBULIN SER CALC-MCNC: 2.7 G/DL (ref 1.9–3.5)
GLUCOSE SERPL-MCNC: 118 MG/DL (ref 65–99)
POTASSIUM SERPL-SCNC: 4.3 MMOL/L (ref 3.6–5.5)
PROT SERPL-MCNC: 6.4 G/DL (ref 6–8.2)
SODIUM SERPL-SCNC: 136 MMOL/L (ref 135–145)

## 2025-02-24 PROCEDURE — 84153 ASSAY OF PSA TOTAL: CPT

## 2025-02-24 PROCEDURE — 36415 COLL VENOUS BLD VENIPUNCTURE: CPT

## 2025-02-24 PROCEDURE — 84403 ASSAY OF TOTAL TESTOSTERONE: CPT

## 2025-02-24 PROCEDURE — 80053 COMPREHEN METABOLIC PANEL: CPT

## 2025-02-25 LAB
PSA SERPL-MCNC: <0.02 NG/ML (ref 0–4)
TESTOST SERPL-MCNC: <3 NG/DL (ref 175–781)

## 2025-06-04 ENCOUNTER — HOSPITAL ENCOUNTER (OUTPATIENT)
Facility: MEDICAL CENTER | Age: 85
End: 2025-06-04
Attending: UROLOGY
Payer: MEDICARE

## 2025-06-04 LAB
ALBUMIN SERPL BCP-MCNC: 3.8 G/DL (ref 3.2–4.9)
ALBUMIN/GLOB SERPL: 1.7 G/DL
ALP SERPL-CCNC: 65 U/L (ref 30–99)
ALT SERPL-CCNC: 12 U/L (ref 2–50)
ANION GAP SERPL CALC-SCNC: 11 MMOL/L (ref 7–16)
AST SERPL-CCNC: 18 U/L (ref 12–45)
BILIRUB SERPL-MCNC: 0.6 MG/DL (ref 0.1–1.5)
BUN SERPL-MCNC: 35 MG/DL (ref 8–22)
CALCIUM ALBUM COR SERPL-MCNC: 9.3 MG/DL (ref 8.5–10.5)
CALCIUM SERPL-MCNC: 9.1 MG/DL (ref 8.5–10.5)
CHLORIDE SERPL-SCNC: 103 MMOL/L (ref 96–112)
CO2 SERPL-SCNC: 26 MMOL/L (ref 20–33)
CREAT SERPL-MCNC: 0.96 MG/DL (ref 0.5–1.4)
FASTING STATUS PATIENT QL REPORTED: NORMAL
GFR SERPLBLD CREATININE-BSD FMLA CKD-EPI: 76 ML/MIN/1.73 M 2
GLOBULIN SER CALC-MCNC: 2.3 G/DL (ref 1.9–3.5)
GLUCOSE SERPL-MCNC: 91 MG/DL (ref 65–99)
POTASSIUM SERPL-SCNC: 4.2 MMOL/L (ref 3.6–5.5)
PROT SERPL-MCNC: 6.1 G/DL (ref 6–8.2)
PSA SERPL-MCNC: <0.02 NG/ML (ref 0–4)
SODIUM SERPL-SCNC: 140 MMOL/L (ref 135–145)
TESTOST SERPL-MCNC: <3 NG/DL (ref 175–781)

## 2025-06-04 PROCEDURE — 84153 ASSAY OF PSA TOTAL: CPT

## 2025-06-04 PROCEDURE — 36415 COLL VENOUS BLD VENIPUNCTURE: CPT

## 2025-06-04 PROCEDURE — 80053 COMPREHEN METABOLIC PANEL: CPT

## 2025-06-04 PROCEDURE — 84403 ASSAY OF TOTAL TESTOSTERONE: CPT

## 2025-06-12 ENCOUNTER — OFFICE VISIT (OUTPATIENT)
Dept: MEDICAL GROUP | Age: 85
End: 2025-06-12
Payer: MEDICARE

## 2025-06-12 VITALS
RESPIRATION RATE: 16 BRPM | DIASTOLIC BLOOD PRESSURE: 62 MMHG | HEIGHT: 70 IN | WEIGHT: 163 LBS | OXYGEN SATURATION: 99 % | BODY MASS INDEX: 23.34 KG/M2 | TEMPERATURE: 97 F | SYSTOLIC BLOOD PRESSURE: 116 MMHG | HEART RATE: 52 BPM

## 2025-06-12 DIAGNOSIS — C61 PROSTATE CANCER (HCC): ICD-10-CM

## 2025-06-12 DIAGNOSIS — R73.03 PREDIABETES: ICD-10-CM

## 2025-06-12 DIAGNOSIS — E78.00 PURE HYPERCHOLESTEROLEMIA: ICD-10-CM

## 2025-06-12 DIAGNOSIS — E11.65 TYPE 2 DIABETES MELLITUS WITH HYPERGLYCEMIA, WITHOUT LONG-TERM CURRENT USE OF INSULIN (HCC): ICD-10-CM

## 2025-06-12 DIAGNOSIS — N40.0 ENLARGED PROSTATE: Primary | ICD-10-CM

## 2025-06-12 LAB
HBA1C MFR BLD: 6 % (ref ?–5.8)
POCT INT CON NEG: NEGATIVE
POCT INT CON POS: POSITIVE

## 2025-06-12 PROCEDURE — 99214 OFFICE O/P EST MOD 30 MIN: CPT | Performed by: PHYSICIAN ASSISTANT

## 2025-06-12 PROCEDURE — 83036 HEMOGLOBIN GLYCOSYLATED A1C: CPT | Performed by: PHYSICIAN ASSISTANT

## 2025-06-12 PROCEDURE — 3078F DIAST BP <80 MM HG: CPT | Performed by: PHYSICIAN ASSISTANT

## 2025-06-12 PROCEDURE — 3074F SYST BP LT 130 MM HG: CPT | Performed by: PHYSICIAN ASSISTANT

## 2025-06-12 PROCEDURE — G2211 COMPLEX E/M VISIT ADD ON: HCPCS | Performed by: PHYSICIAN ASSISTANT

## 2025-06-12 RX ORDER — TAMSULOSIN HYDROCHLORIDE 0.4 MG/1
0.4 CAPSULE ORAL DAILY
COMMUNITY

## 2025-06-12 ASSESSMENT — PATIENT HEALTH QUESTIONNAIRE - PHQ9: CLINICAL INTERPRETATION OF PHQ2 SCORE: 0

## 2025-06-12 ASSESSMENT — FIBROSIS 4 INDEX: FIB4 SCORE: 1.82

## 2025-06-12 NOTE — PROGRESS NOTES
"Subjective:     History of Present Illness  The patient presents for evaluation of type 2 diabetes and prostate cancer.    He has been maintaining a regular exercise regimen, attending the gym approximately three times per week. His workout routine includes balance exercises and the use of various machines, excluding the treadmill. He reports no cardiac-related symptoms such as chest pain. His glucose level was recorded at 91 during his last visit, a slight increase from the previous reading of 88. He reports consuming a honey bar the day prior to the test. He also acknowledges a past habit of excessive candy consumption, which he has since discontinued. He is currently on prednisone therapy.    He is taking tamsulosin and abiraterone for prostate cancer. He is also taking prednisone.      Current medicines (including changes today)  Current Medications[1]  He  has a past medical history of DVT (deep venous thrombosis) ([facility]) (1995) and Patient denies medical problems.    ROS   No chest pain, no shortness of breath, no abdominal pain  Positive ROS as per HPI.  All other systems reviewed and are negative.     Objective:     /62 (BP Location: Right arm, Patient Position: Sitting, BP Cuff Size: Adult)   Pulse (!) 52   Temp 36.1 °C (97 °F) (Temporal)   Resp 16   Ht 1.778 m (5' 10\")   Wt 73.9 kg (163 lb)   SpO2 99%  Body mass index is 23.39 kg/m².   Physical Exam  Respiratory: Clear to auscultation, no wheezing, rales or rhonchi  Cardiovascular: Regular rate and rhythm, no murmurs, rubs, or gallops  Constitutional: Alert, no distress.  Skin: Warm, dry, good turgor, no rashes in visible areas.  Eye: Equal, round and reactive, conjunctiva clear, lids normal.  ENMT: Lips without lesions, good dentition, oropharynx clear.  Neck: Trachea midline, no masses, no thyromegaly. No cervical or supraclavicular lymphadenopathy  Respiratory: Unlabored respiratory effort, lungs clear to auscultation, no wheezes, no " samra.  Cardiovascular: Normal S1, S2, no murmur, no edema.  Abdomen: Soft, non-tender, no masses, no hepatosplenomegaly.  Psych: Alert and oriented x3, normal affect and mood.      Results  Labs   - Glucose level: 06/04/2025, 91   - A1c: 09/2024, 6.5%   - A1c: 06/2024, 5.7%   - A1c: 12/2024, 6.0%        Assessment and Plan:   The following treatment plan was discussed    Assessment & Plan  1. prediabets  - Blood glucose levels have been well-regulated, with a recent reading of 91.  - A1c levels have fluctuated, with a reading of 6.0% today  - A fingerstick test will be conducted today to assess the current A1c level.  - If the results indicate a prediabetic state, the diagnosis of type 2 diabetes will be removed and replaced with prediabetes.    2. Prostate cancer.  - Currently taking tamsulosin, abiraterone, and prednisone for prostate cancer management.  - Reports that his doctor is happy with his current status.  - No new symptoms or concerns discussed.  - Continue current medications.    Follow-up  - The patient will follow up in 6 months for an annual wellness visit.      ORDERS:  1. Enlarged prostate (Primary)      2. Prostate cancer (HCC)      3. Pure hypercholesterolemia      4. Type 2 diabetes mellitus with hyperglycemia, without long-term current use of insulin (HCC)      5. Prediabetes    - POCT A1C          Follow Up: 6 months  annual    Please note that this dictation was created using voice recognition software. I have made every reasonable attempt to correct obvious errors, but I expect that there are errors of grammar and possibly content that I did not discover before finalizing the note.      Attestation      Verbal consent was acquired by the patient to use [employer] ambient listening note generation during this visit Yes                  [1]   Current Outpatient Medications   Medication Sig Dispense Refill    tamsulosin (FLOMAX) 0.4 MG capsule Take 0.4 mg by mouth every day.      latanoprost  (XALATAN) 0.005 % Solution INSTILL 1 DROP INTO EACH EYE ONCE DAILY AT NIGHT      Abiraterone Acetate 250 MG Tab Take 250 mg by mouth 4 times a day.      predniSONE (DELTASONE) 5 MG Tab Take 5 mg by mouth 2 times a day.      timolol (TIMOPTIC) 0.5 % Solution INSTILL 1 DROP INTO EACH EYE IN THE MORNING       No current facility-administered medications for this visit.

## 2025-07-18 ENCOUNTER — HOSPITAL ENCOUNTER (OUTPATIENT)
Dept: RADIOLOGY | Facility: MEDICAL CENTER | Age: 85
End: 2025-07-18
Attending: PHYSICIAN ASSISTANT
Payer: MEDICARE

## 2025-07-18 DIAGNOSIS — M85.80 OTHER SPECIFIED DISORDERS OF BONE DENSITY AND STRUCTURE, UNSPECIFIED SITE: ICD-10-CM

## 2025-07-18 PROCEDURE — 77080 DXA BONE DENSITY AXIAL: CPT
